# Patient Record
Sex: FEMALE | Race: WHITE | Employment: FULL TIME | URBAN - METROPOLITAN AREA
[De-identification: names, ages, dates, MRNs, and addresses within clinical notes are randomized per-mention and may not be internally consistent; named-entity substitution may affect disease eponyms.]

---

## 2022-06-17 NOTE — PROGRESS NOTES
Diane Blackwell MD                                  [address] [facility] 2050, [address] [facility]                                  [place] [address]                                  Phone [phone], Fax [phone]      History and Physical    Patient: Jeronimo Leyva MRN: [id number]     YOB: 1975  Age: 48 y.o. Sex: female              PCP: Shobha Townsend APRN - NP   Date:  6/20/2022      Jeronimo Leyva  who presents to the [facility] for consideration of bariatric surgery. This is her initial consultation preparing her for our multi-disciplinary surgical preparatory regimen. She presents with a height of 5' 3.5\" (1.613 m) and weight of 293 lb (132.9 kg), giving her a Body mass index is 51.09 kg/m². She has an Ideal body weight of 145 lbs, and excess body weight of 148 lbs. 30-day Bariatric Surgical Risk Percentage: 3.18, 8.15%    MEDICAL HISTORY:  Morbid Obesity  Depression  Anxiety  Hx of blood transfusion  Hx of COVID-19  Hx of kidney stones    Comorbidity Yes or No   Obstructive Sleep Apnea No   Diabetes Mellitus No   Hypertension-  Number of medications= 1 Yes   Gastroesophageal Reflux  Treatment Med= Omeprazole Yes   Hyperlipidemia with medication No   Coronary Artery Disease No   Cancer No   Asthma No   Osteoarthritis No   Joint Pain Yes     *EGD    Other Yes or No   Venous Stasis No   Dialysis No   Long term use of steroids or immunocompromised conditions No   On Anticoagulants No       PRIOR WEIGHT LOSS ATTEMPTS:  Reports 11-15 previous attempts including Phentermine, keto diet and low carb diet. EXERCISE ASSESSMENT:  Reports occasional exercise via walking. DENTAL ISSUES:  No dental issues with chewing. PSYCHOSOCIAL:  She notes she is  and states main support person is her , Lily Bauer. She is employed in [address] [place] [address] at [employer]. Her goal in pursuing surgical weight loss is to improve health.   She reports appropriate treatment of depression and anxiety. Denies history of mental health disorders . She states she is independent in her care, can drive a car, and can ambulate without assistance. HISTORY:  No past medical history on file. She denies personal or family history of problems with anesthesia, bleeding, or clotting. She denies history of DVT or pulmonary embolism. She denies reflux or dysphagia. No past surgical history on file. Social History     Tobacco Use    Smoking status: Not on file    Smokeless tobacco: Not on file   Substance Use Topics    Alcohol use: Not on file    Drug use: Not on file     No family history on file. MEDICATIONS:  No current outpatient medications on file. No current facility-administered medications for this visit. ALLERGIES:  Allergies   Allergen Reactions    Anesthesia S-I-40 [Propofol] Nausea And Vomiting    Lortab [Hydrocodone-Acetaminophen] Itching and Nausea And Vomiting       ROS: The patient has no difficulty with chest pain or shortness of breath. No fever or chills. Comprehensive 13 point review of systems was otherwise unremarkable except as noted above. PHYSICAL EXAM:   BP (!) 160/98   Pulse 96   Ht 5' 3.5\" (1.613 m)   Wt 293 lb (132.9 kg)   BMI 51.09 kg/m²     General: Alert oriented cooperative white female in no acute distress. Eyes: Sclera are clear. Conjunctiva and lids within normal limits. No icterus. Ears and Nose: no gross deformities to visual inspection, gross hearing intact  Neck: Supple, trachea midline, no appreciable thyromegaly  Resp: No JVD. Breathing is  non-labored. Lungs clear to auscultation without wheezing or rhonchi   CV: RRR. No murmurs, rubs or gallops appreciated, Carotid bruits are absent  Abd: soft non-tender and non-distended without peritoneal signs. +bs    Extremities: non-tender. No edema   Neuro:  No focal signs  Psych:  Mood and affect appropriate.   Short-term memory and understanding intact    ASSESSMENT:  Morbid obesity with a  Body mass index is 51.09 kg/m². beginning multi-disciplinary surgical prep program.    PLAN:  We have discussed the patient's participation and reviewed the steps in our preparatory program. She has had a long history of failed diet and exercise programs and has good understanding about the risks, benefits, and commitment related to bariatric surgery. She has attended our comprehensive educational seminar. She is interested in proceeding with our program seeking the sleeve gastrectomy. Nutrition Recommendations:   · Diet Rx - Low-moderate calorie intake. Work on eating at least 3x/d with lean protein focus. 2 week pre-operative diet with caloric restriction of ~1200kcal/d. Exercise Recommendations: Exercise routine, incorporating both cardio and strength training, building up to 5x/wk for at least 30 minutes. Physical therapy evaluation for guidance on exercise routine. She will complete the following steps:  1. Complete bariatric labs after dietitian evaluation. 2.  Participation in our behavior modification program, reduced calorie diet program, and participation in our exercise program recommendations supervised by our office. 3.  Complete our required psychological evaluation. 4.  Reminded of policy of no weight gain during prep period. 5.  Upper GI  6. PT Consult       She will return after the above are complete for assessment of her progress and schedule surgery. Rayvon Berg [place] MD    Date:  6/20/2022

## 2022-06-20 ENCOUNTER — OFFICE VISIT (OUTPATIENT)
Dept: SURGERY | Age: 47
End: 2022-06-20
Payer: COMMERCIAL

## 2022-06-20 VITALS
WEIGHT: 293 LBS | HEART RATE: 96 BPM | DIASTOLIC BLOOD PRESSURE: 98 MMHG | BODY MASS INDEX: 50.02 KG/M2 | HEIGHT: 64 IN | SYSTOLIC BLOOD PRESSURE: 160 MMHG

## 2022-06-20 DIAGNOSIS — E66.01 MORBID OBESITY (HCC): Primary | ICD-10-CM

## 2022-06-20 DIAGNOSIS — Z01.812 ENCOUNTER FOR PRE-OPERATIVE LABORATORY TESTING: ICD-10-CM

## 2022-06-20 DIAGNOSIS — I10 PRIMARY HYPERTENSION: ICD-10-CM

## 2022-06-20 DIAGNOSIS — Z87.19 HX OF GASTROESOPHAGEAL REFLUX (GERD): ICD-10-CM

## 2022-06-20 DIAGNOSIS — E66.01 OBESITY, CLASS III, BMI 40-49.9 (MORBID OBESITY) (HCC): ICD-10-CM

## 2022-06-20 PROCEDURE — APPSS30 APP SPLIT SHARED TIME 16-30 MINUTES: Performed by: PHYSICIAN ASSISTANT

## 2022-06-20 PROCEDURE — 99204 OFFICE O/P NEW MOD 45 MIN: CPT | Performed by: SURGERY

## 2022-06-29 ENCOUNTER — HOSPITAL ENCOUNTER (OUTPATIENT)
Dept: PHYSICAL THERAPY | Age: 47
Setting detail: RECURRING SERIES
Discharge: HOME | End: 2022-07-02
Payer: COMMERCIAL

## 2022-06-29 PROCEDURE — 97162 PT EVAL MOD COMPLEX 30 MIN: CPT

## 2022-06-29 PROCEDURE — 97110 THERAPEUTIC EXERCISES: CPT

## 2022-06-29 ASSESSMENT — PAIN SCALES - GENERAL: PAINLEVEL_OUTOF10: 6

## 2022-06-29 NOTE — PLAN OF CARE
Naa Linder  : 1975  Primary: Yasmin Simon  Secondary:  [address] [address]  Phone: [phone]  Fax: [phone] Plan Frequency: 1 time    No data recorded    PT Visit Info: Total # of Visits to Date: 1      OUTPATIENT PHYSICAL THERAPY:OP NOTE TYPE: Initial Assessment 2022               Episode  Appt Desk         Treatment Diagnosis:  Generalized muscle weakness(M62.81)  Medical/Referring Diagnosis:  Morbid (severe) obesity due to excess calories [E66.01]  Referring Physician:  Brina Rand MD MD Orders:  PT Eval and Treat   Return MD Appt:  unsure  Date of Onset:  No data recorded   Allergies:  Anesthesia s-i-40 [propofol] and Lortab [hydrocodone-acetaminophen]  Restrictions/Precautions:    Restrictions/Precautions: None  Spinal Precautions: lumbar fusion     Medications Last Reviewed:  2022     SUBJECTIVE   History of Injury/Illness (Reason for Referral):  Pt reports not being able to lose weight on her own and has even worked out with a  in the gym so she has opted for surgery. Patient Stated Goal(s):  \"be ready for surgery. Get back to exercising in the gym\"  Initial:     6/10 Post Session:       no number or complaint of pain  Past Medical History/Comorbidities:    Ms. Rosario Collins  has a past surgical history that includes back surgery. 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   No data recordedNo data recorded   Learning:   Does the patient/guardian have any barriers to learning?: No barriers  Will there be a co-learner?: No  What is the preferred language of the patient/guardian?: English  How does the patient/guardian prefer to learn new concepts?: Listening; Reading; Demonstration; Pictures/Videos     Fall Risk Scale: Total Score: 0  Martel Fall Risk: Low (0-24)           OBJECTIVE   Observation: excessive lumbar lordosis from fusion. No distress. Fluent movements  ROM: BUE/LE are within normal limits. Trunk WNL  Strength: BUE/LE 5/5. Core 4-/5  ASSESSMENT   Initial Assessment:  Pt comes to therapy for pre bariatric exercise program. She displayed no issues with ROM or strength but does have a weak core. She is motivated and demonstrated good technique with the exercises. She desires to continue as a home program. Goal met to be independent. Problem List: (Impacting functional limitations): Body Structures, Functions, Activity Limitations Requiring Skilled Therapeutic Intervention: Decreased strength     Therapy Prognosis:   Therapy Prognosis: Excellent        PLAN   Effective Dates: 6/29/22 TO today  Frequency/Duration: Plan Frequency: 1 time     Interventions Planned (Treatment may consist of any combination of the following):    Current Treatment Recommendations: Home exercise program     Goals: (Goals have been discussed and agreed upon with patient.)  Discharge Goals: Time Frame: 1 visit  1. Pt independent with HEP- met         Outcome Measure: Tool Used: Lower Extremity Functional Scale (LEFS)  Score:  Initial: 69/80 Most Recent: X/80 (Date: -- )   Interpretation of Score: 20 questions each scored on a 5 point scale with 0 representing \"extreme difficulty or unable to perform\" and 4 representing \"no difficulty\". The lower the score, the greater the functional disability. 80/80 represents no disability. Minimal detectable change is 9 points. Total Duration:  Time In: 0900  Time Out: 1088    Thank you for this referral,  Sheyla Santillan PT     Referring Physician Signature: Mino Collier MD No Signature is Required for this note.         Post Session Pain  Charge Capture  PT Visit Info  POC Link  Treatment Note Link  MD Guidelines  MyChart

## 2022-06-29 NOTE — PROGRESS NOTES
Elaine German  : 1975  Primary: Mark Olivarez  Secondary:  [address] [address]  Phone: [phone]  Fax: [phone] Plan Frequency: 1 time  22 to today     PT Visit Info: Total # of Visits to Date: 1      OUTPATIENT PHYSICAL THERAPY:OP NOTE TYPE: Treatment Note 2022       Episode  }Appt Desk              Treatment Diagnosis: (62.81) generalized muscle weakness  Medical/Referring Diagnosis:  Morbid (severe) obesity due to excess calories [E66.01]  Referring Physician:  Parisa Jackman MD MD Orders:  PT Eval and Treat   Date of Onset:  No data recorded   Allergies:   Anesthesia s-i-40 [propofol] and Lortab [hydrocodone-acetaminophen]  Restrictions/Precautions:  Restrictions/Precautions: None  Spinal Precautions: lumbar fusion     Interventions Planned (Treatment may consist of any combination of the following):    Current Treatment Recommendations: Home exercise program     Subjective Comments:    pt reports trying to lose weight and even lifting weights in the gym with  didn't help so she has opted for surgery  Initial:}    6/10Post Session:        no  Number or complaint of pain  Medications Last Reviewed:  2022  Updated Objective Findings:  See evaluation note from today  Treatment   THERAPEUTIC EXERCISE: (20 minutes):    Exercises per grid below to improve mobility and strength. Required moderate verbal and manual cues to promote proper body alignment, promote proper body posture, promote proper body mechanics and promote proper body breathing techniques. Reviewed and issued HEP with hooklying ball press with core, bridging with SAQ and core, LTR with core, quadruped UE/LE lift with core, seated hamstring stretch with anterior pelvic tilt, standing quad stretch, diagonal ball lift with squat, \"Y\" for core and upper trap. Pt to perform rior to surgery and after once cleared by MD. Pt agreed.     Treatment/Session Summary:    · Treatment Assessment:   good return demonstration of exercises. Goal met  · Communication/Consultation:  None today  · Equipment provided today:  None  · Recommendations: discharge to [facility].     Total Treatment Billable Duration:  20 minutes in addition to evaluation   Time In: 0900  Time Out: Adilene Haji 178, PT       Charge Capture  }Post Session Pain  PT Visit Info  [address] [facility] Portal  MD Guidelines  Scanned Media  Benefits  MyChart    Future Appointments   Date Time Provider Polly Trejoi   7/8/2022  9:30 AM SFD XR RF ROOM 2 SFDRAD SFD   7/29/2022  8:00 AM NORTH Morgan GVL AMB   8/4/2022  8:00 AM ORLY Chin GVL AMB   8/19/2022  8:00 AM ORLY Chin GVL AMB

## 2022-07-08 ENCOUNTER — HOSPITAL ENCOUNTER (OUTPATIENT)
Dept: GENERAL RADIOLOGY | Age: 47
Discharge: HOME | End: 2022-07-11
Payer: COMMERCIAL

## 2022-07-08 DIAGNOSIS — E66.01 MORBID OBESITY (HCC): ICD-10-CM

## 2022-07-08 PROCEDURE — 6360000004 HC RX CONTRAST MEDICATION: Performed by: SURGERY

## 2022-07-08 PROCEDURE — 74220 X-RAY XM ESOPHAGUS 1CNTRST: CPT

## 2022-07-08 PROCEDURE — 2500000003 HC RX 250 WO HCPCS: Performed by: SURGERY

## 2022-07-08 PROCEDURE — 6370000000 HC RX 637 (ALT 250 FOR IP): Performed by: SURGERY

## 2022-07-08 PROCEDURE — A4641 RADIOPHARM DX AGENT NOC: HCPCS | Performed by: SURGERY

## 2022-07-08 RX ADMIN — BARIUM SULFATE 140 ML: 980 POWDER, FOR SUSPENSION ORAL at 10:28

## 2022-07-08 RX ADMIN — BARIUM SULFATE 355 ML: 0.6 SUSPENSION ORAL at 10:28

## 2022-07-08 RX ADMIN — BARIUM SULFATE 1 TABLET: 700 TABLET ORAL at 10:28

## 2022-07-08 RX ADMIN — ANTACID/ANTIFLATULENT 1 EACH: 380; 550; 10; 10 GRANULE, EFFERVESCENT ORAL at 10:28

## 2022-07-24 NOTE — PROGRESS NOTES
Elysia Ochoa PA-C  Bariatric & Advanced Laparoscopic Surgery & Endoscopy  [address] [place] [address] [address]50, [address] [facility]  Eufemia Quintanalidia Gloria  Phone [phone]   Fax [phone]    Date of visit: 2022      Primary/Requesting provider: DEBBIE Barger NP         Name: Ed Deleon      MRN: [id number]       : 1975       Age: 48 y.o. Sex: female        PCP: DEBBIE Barger NP     CC:  Bariatric monthly dietary follow up    Surgeon: Dr. Dg Nair. Matheus    Procedure: laparoscopic sleeve gastrectomy    Surgical Consult Date: 2022    The patient is a 48 y.o. female presenting with a height of 5' 3.5\" (1.613 m) and weight of 293 lb (132.9 kg), giving her a Body mass index is 51.09 kg/m². She has an ideal body weight of 143 lbs, and excess body weight of 150 lbs. She started our program with a weight of 293 lbs, remained weight stable. She is in the process of completing all aspects of our prep program and to be deemed an acceptable candidate for bariatric surgery. Patient has a long term history of obesity with multiple failed attempts at weight reduction. Patient denies any changes in health history or physical health since last visit. Patient has been adherent to her diet and exercise regimen. Patient feels that she is well informed regarding her bariatric surgery choice and would like to proceed with a laparoscopic sleeve gastrectomy for weight reduction, improvement of her medical conditions, and improved quality of life. Patient verbalized understanding of the risks associated with bariatric surgery. Patient also verbalized understanding that bariatric surgery is a tool and that weight reduction is dependent on behavioral changes in regards to what she eats and how much.     NUTRITION ASSESSMENT:   Diet Recall:   Breakfast: protein shake  Lunch: grilled chicken sandwich  Dinner: grilled chicken with mushrooms, pasta  Beverages: crystal light lemonade, water  Habits:   Eating 3x/day: yes  Elimination of caffeine and carbonated beverages: yes  Practicing not drinking with meals: yes     EXERCISE ASSESSMENT: Pt is currently exercising via walking 3-4x per week for 30 minutes. NIH Guidelines reviewed with pt. PSYCHOLOGICAL EVALUATION:  Scheduled, 08/04/2022 with Venessa Grewal  DIETITIAN EVALUATION:  In progress with Marie Hinton RD, LD  BARIATRIC LABS:  Completed, 07/28/2022 (vitamin D 14.7, ferritin 6)  UPPER GI:  Completed, 07/08/2022  1. Small sliding hiatal hernia. 2. Duodenal diverticulum. PHYSICAL THERAPY EVALUATION FOR MOBILITY OPTIMIZATION:  Completed, 06/29/2022    MEDICAL HISTORY:  Morbid Obesity  Depression  Anxiety  Hx of blood transfusion  Hx of COVID-19  Hx of kidney stones     Comorbidity Yes or No   Obstructive Sleep Apnea No   Diabetes Mellitus No   Hypertension-  Number of medications= 1 Yes   Gastroesophageal Reflux  Treatment Med= Omeprazole Yes   Hyperlipidemia with medication No   Coronary Artery Disease No   Cancer No   Asthma No   Osteoarthritis No   Joint Pain Yes        Other Yes or No   Venous Stasis No   Dialysis No   Long term use of steroids or immunocompromised conditions No   On Anticoagulants No      PMH:  No past medical history on file. PSH:  Past Surgical History:   Procedure Laterality Date    BACK SURGERY         MEDS:  Current Outpatient Medications   Medication Sig Dispense Refill    vitamin D (ERGOCALCIFEROL) 1.25 MG (16834 UT) CAPS capsule Take 1 capsule by mouth once a week 12 capsule 1     No current facility-administered medications for this visit. ALLERGIES:    Allergies   Allergen Reactions    Anesthesia S-I-40 [Propofol] Nausea And Vomiting    Lortab [Hydrocodone-Acetaminophen] Itching and Nausea And Vomiting       SH:       FH:  No family history on file.        Physical Exam:     BP (!) 144/94   Pulse 78   Ht 5' 3.5\" (1.613 m)   Wt 293 lb (132.9 kg)   BMI 51.09 kg/m²     General:  Well-developed, well-nourished, no distress. Psych:  Cooperative, good insight and judgement. Neuro:  Alert, oriented to person, place and time. Lungs:  Unlabored breathing. Symmetrical chest expansion. Heart:  Regular rate and rhythm. Abdomen:  Soft, obese, non-tender, non-distended. No guarding or rebound. No palpable masses. Labs: All recent labs were reviewed. Lab Results   Component Value Date    LABA1C 5.4 07/28/2022     No results found for: TSHFT4, TSH   Lab Results   Component Value Date    VITD25 14.7 (L) 07/28/2022      Lab Results   Component Value Date    CMVJBPCH52 436 07/28/2022      Lab Results   Component Value Date    IRON 51 07/28/2022    FERRITIN 6 (L) 07/28/2022        Imaging: All images were independently reviewed by me. Diagnosis Orders   1. Morbid obesity ([facility]        2. Obesity, morbid, BMI 50 or higher ([facility]        3. Hx of gastroesophageal reflux (GERD)        4. Primary hypertension        5. Dietary counseling        6. Exercise counseling        7. Vitamin D deficiency  vitamin D (ERGOCALCIFEROL) 1.25 MG (53621 UT) CAPS capsule          Assessment/Plan:    Naa Linder is a 48 y.o. female is here for monthly follow-up visit prior to bariatric surgery with medical co-morbidities related to morbid obesity. Patient is an excellent candidate for bariatric surgery and meets NIH criteria for weight loss surgery. In detail, discussed risks and benefits of laparoscopic sleeve gastrectomy. Reviewed information and expectations regarding the pre-operative period, the bariatric procedure, and postoperative period. Stressed with patient importance of understanding bariatric surgery is a tool and will not work if patient does not continue with healthy lifestyle changes including regular exercise and high protein, low calorie, low carb diet. Patient was seen by the dietitian today for continued evaluation and management regarding lifestyle and dietary changes.  Reviewed assessment and plan in detail. Patient verbalized understanding. Questions answered. Recommendations: Continue to work on dietary changes over the next couple of weeks. Complete remaining testing requirements including psychology appointments. She may contact the office after completion to schedule her pre-operative appointment. She does note that she is a difficult IV stick, and previous procedures have required central line placement for IV access. We will likely need to place a central line for her surgery. Won Perdomo PA-C  Bariatric Surgery  7/29/2022    Counseling time:counseling time more than 50% of visit: 45 minutes: I spent this time preparing to see patient (including chart review and preparation), obtaining and/or reviewing additional medical history, performing a physical exam and evaluation, documenting clinical information in the electronic health record, independently interpreting results, communicating results to patient, family or caregiver, and/or coordinating care.

## 2022-07-28 ENCOUNTER — HOSPITAL ENCOUNTER (OUTPATIENT)
Dept: LAB | Age: 47
Discharge: HOME | End: 2022-07-31

## 2022-07-28 DIAGNOSIS — E66.01 MORBID OBESITY (HCC): ICD-10-CM

## 2022-07-28 LAB
EST. AVERAGE GLUCOSE BLD GHB EST-MCNC: 108 MG/DL
HBA1C MFR BLD: 5.4 % (ref 4.8–5.6)

## 2022-07-29 ENCOUNTER — OFFICE VISIT (OUTPATIENT)
Dept: SURGERY | Age: 47
End: 2022-07-29
Payer: COMMERCIAL

## 2022-07-29 VITALS
WEIGHT: 293 LBS | HEIGHT: 64 IN | SYSTOLIC BLOOD PRESSURE: 144 MMHG | BODY MASS INDEX: 50.02 KG/M2 | HEART RATE: 78 BPM | DIASTOLIC BLOOD PRESSURE: 94 MMHG

## 2022-07-29 DIAGNOSIS — E66.01 OBESITY, MORBID, BMI 50 OR HIGHER (HCC): ICD-10-CM

## 2022-07-29 DIAGNOSIS — E55.9 VITAMIN D DEFICIENCY: ICD-10-CM

## 2022-07-29 DIAGNOSIS — Z87.19 HX OF GASTROESOPHAGEAL REFLUX (GERD): ICD-10-CM

## 2022-07-29 DIAGNOSIS — E66.01 MORBID OBESITY (HCC): Primary | ICD-10-CM

## 2022-07-29 DIAGNOSIS — Z71.3 DIETARY COUNSELING: ICD-10-CM

## 2022-07-29 DIAGNOSIS — I10 PRIMARY HYPERTENSION: ICD-10-CM

## 2022-07-29 DIAGNOSIS — Z71.82 EXERCISE COUNSELING: ICD-10-CM

## 2022-07-29 LAB
25(OH)D3 SERPL-MCNC: 14.7 NG/ML (ref 30–100)
FERRITIN SERPL-MCNC: 6 NG/ML (ref 8–388)
FOLATE SERPL-MCNC: 7.7 NG/ML (ref 3.1–17.5)
IRON SERPL-MCNC: 51 UG/DL (ref 35–150)
TSH, 3RD GENERATION: 0.66 UIU/ML (ref 0.36–3.74)
VIT B12 SERPL-MCNC: 436 PG/ML (ref 193–986)

## 2022-07-29 PROCEDURE — 99215 OFFICE O/P EST HI 40 MIN: CPT | Performed by: PHYSICIAN ASSISTANT

## 2022-07-29 RX ORDER — FUROSEMIDE 20 MG/1
20 TABLET ORAL DAILY
Status: ON HOLD | COMMUNITY
Start: 2022-06-06 | End: 2022-09-15 | Stop reason: HOSPADM

## 2022-07-29 RX ORDER — CLONAZEPAM 0.5 MG/1
TABLET ORAL
COMMUNITY
Start: 2022-07-12

## 2022-07-29 RX ORDER — IRBESARTAN 150 MG/1
TABLET ORAL
COMMUNITY
Start: 2022-07-12

## 2022-07-29 RX ORDER — ERGOCALCIFEROL 1.25 MG/1
50000 CAPSULE ORAL WEEKLY
Qty: 12 CAPSULE | Refills: 1 | Status: SHIPPED | OUTPATIENT
Start: 2022-07-29

## 2022-07-29 RX ORDER — VENLAFAXINE HYDROCHLORIDE 75 MG/1
75 CAPSULE, EXTENDED RELEASE ORAL DAILY
COMMUNITY
Start: 2022-07-21

## 2022-07-29 NOTE — PROGRESS NOTES
Ronnie Damon, MS, RD, LD  Surgical Weight Loss Dietitian  [address] [facility], [address] [facility]  Kristin Quintana  Phone [phone]   Fax [phone]    MWL INITIAL ASSESSMENT    Nutrition Assessment:  Anthropometrics:    Ht: 53.5, wt: 293#, 202% IBW, 51.09 BMI  Pt has lost 0 lbs since last visit. Co-morbidities: HTN, GERD, Joint pain    Labs:   Vit D, 25-Hydroxy (ng/mL)   Date Value   07/28/2022 14.7 (L)     Folate (ng/mL)   Date Value   07/28/2022 7.7     Iron (ug/dL)   Date Value   07/28/2022 51     Ferritin (NG/ML)   Date Value   07/28/2022 6 (L)      Pt is not taking a Vitamin D supplement per pt. Referred to provider for recommendation. Macronutrient needs assessment   EER: 3898-6025 kcal/d (14-16 kcal/kg ABW)    MSJ x 1.3-500 = 2021 kcal/d (sedentary AF)   EPR: 55-82g pro (1.0-1.5 gm pro/kg IBW)   CHO: ~253 g CHO/d (50% EER, ~5-6 servings CHO/meal)   H2O: 1mL/kcal or per MD recommendations     Support:  Pt has told , friends, kids, father - to be good support. Reminded pt about SWL support group and online support group. Motivation:  High. Onset of obesity:  Childhood    Potential triggers to weight gain: emotional eating, food-centered gatherings   Hx of obesity in family members include cousins, father, neice. History of Weight loss attempts, goal >10# weight loss:    Prior bariatric programs: none    Commercial programs: none    Registered Dietitian visits: none    Medical Weight Loss programs, including medications: yes, phentermine    Self-supervised diets and exercise: low-carb, low-calorie, low-fat, moderate exercise   Pt has attempted weight loss via modalities listed above - ~20+ attempts made, all without any permanent weight loss. Pt goal in pursuing SWL is better health, better mobility, decreased risk of comorbidities, lose a large amount of weight.       Eating Habits:   Eating occasions/d: 3  Main cook at home: pt and   Restaurant/Fast Food Intake: 1x/week  Food Allergies: none  Cultural Preferences: none  Typical Beverage Consumption: crystal light, water, protein shake  Diet Recall:   Breakfast: protein shake  Lunch: grilled chicken sandwich  Dinner: grilled chicken with mushrooms, pasta  Beverages: crystal light lemonade, water  Feeling after eating meals:  Comfortable  Habits:   Eating mindfully: yes   Drinking after meals: practicing, currently waiting 15-30 minutes   Elimination of sugary/carbonated/caffeinated/alcoholic beverages: yes   Drinking without straws: yes    Lifestyle Assessment:    Hours of sleep/night: ~8-9   Current Occupation: [employer]   Activity during day: moderate   Type of Housing: house    Number of people living in house and influence: 3 including Pt, , and son (13) - to be good influence. Exercise Assessment:   PAR-Q: none   Exercise equipment at home: weights   Gym Membership: none  Medical:     Pain or injuries (present): lower and middle back - lumbar fusion and muscle spasms, bilateral knee pain    Past surgeries related to mobility: L ankle  Current Exercise Routine: walking and pt exercises 3-4x/week for 20 minutes  Assessment Exercise Goal: walking and pt exercises 3-4x/week for 30 minutes    Nutrition Diagnosis:  Morbid obesity R/T excessive energy intake and yoyo dieting as evidenced by BMI = 51.09 and 202 % IBW. Nutrition Intervention:   Diet Rx - Low-moderate calorie intake (~2000 kcal/day). Work on eating 3 meals/d and meal balance. Modify distribution, type or amount of food and nutrients within meals or at a specified time-    Discussed need for 3 meals per day and snacks based on body size. Explained macronutrients and emphasized mindful eating behaviors. Discussed meal priority and encourage practice. Discussed waiting to drink 30 minutes after meals and encouraged practice. Pt goal to increasing hydrating fluid intake.    Educated on counting protein and encouraged practice. Educated on need for identifying MVI for use after sx. *Pt verbalizes understanding of information provided during this session. Nutrition Monitoring and Evaluation:   Monitor for understanding of diet and exercise rx. Follow up for practicing mindful eating behaviors and meal priority. F/U per MD    Impression:      Readiness to learn and change: Moderate   Comprehension level: Moderate   Anticipated compliance: Moderate     Good SWL candidate at this time. Pt has made good changes during supervised diet with RD. RD feels as though pt will be able to adhere to post-operative instructions and plan of care. Pt understands the habits that must be in place to support the SWL tool. I look forward to continuing to work with pt.       Ronnie Damon, MS, RD, LD

## 2022-08-04 ENCOUNTER — OFFICE VISIT (OUTPATIENT)
Dept: BEHAVIORAL/MENTAL HEALTH CLINIC | Facility: CLINIC | Age: 47
End: 2022-08-04

## 2022-08-04 DIAGNOSIS — E66.01 OBESITY, MORBID, BMI 40.0-49.9 (HCC): ICD-10-CM

## 2022-08-04 DIAGNOSIS — F54 PSYCHOLOGICAL AND BEHAVIORAL FACTORS ASSOCIATED WITH DISORDERS OR DISEASES CLASSIFIED ELSEWHERE: Primary | ICD-10-CM

## 2022-08-04 LAB
COTININE SERPL-MCNC: <1 NG/ML
NICOTINE SERPL-MCNC: <1 NG/ML

## 2022-08-04 ASSESSMENT — ANXIETY QUESTIONNAIRES
7. FEELING AFRAID AS IF SOMETHING AWFUL MIGHT HAPPEN: 0
4. TROUBLE RELAXING: 0
5. BEING SO RESTLESS THAT IT IS HARD TO SIT STILL: 0
6. BECOMING EASILY ANNOYED OR IRRITABLE: 0
GAD7 TOTAL SCORE: 0
1. FEELING NERVOUS, ANXIOUS, OR ON EDGE: 0
2. NOT BEING ABLE TO STOP OR CONTROL WORRYING: 0
3. WORRYING TOO MUCH ABOUT DIFFERENT THINGS: 0

## 2022-08-04 ASSESSMENT — PATIENT HEALTH QUESTIONNAIRE - PHQ9
5. POOR APPETITE OR OVEREATING: 0
7. TROUBLE CONCENTRATING ON THINGS, SUCH AS READING THE NEWSPAPER OR WATCHING TELEVISION: 0
SUM OF ALL RESPONSES TO PHQ QUESTIONS 1-9: 1
3. TROUBLE FALLING OR STAYING ASLEEP: 1
6. FEELING BAD ABOUT YOURSELF - OR THAT YOU ARE A FAILURE OR HAVE LET YOURSELF OR YOUR FAMILY DOWN: 0
8. MOVING OR SPEAKING SO SLOWLY THAT OTHER PEOPLE COULD HAVE NOTICED. OR THE OPPOSITE, BEING SO FIGETY OR RESTLESS THAT YOU HAVE BEEN MOVING AROUND A LOT MORE THAN USUAL: 0
1. LITTLE INTEREST OR PLEASURE IN DOING THINGS: 0
SUM OF ALL RESPONSES TO PHQ QUESTIONS 1-9: 1
4. FEELING TIRED OR HAVING LITTLE ENERGY: 0
SUM OF ALL RESPONSES TO PHQ QUESTIONS 1-9: 1
2. FEELING DOWN, DEPRESSED OR HOPELESS: 0
SUM OF ALL RESPONSES TO PHQ9 QUESTIONS 1 & 2: 0
SUM OF ALL RESPONSES TO PHQ QUESTIONS 1-9: 1
10. IF YOU CHECKED OFF ANY PROBLEMS, HOW DIFFICULT HAVE THESE PROBLEMS MADE IT FOR YOU TO DO YOUR WORK, TAKE CARE OF THINGS AT HOME, OR GET ALONG WITH OTHER PEOPLE: 0
9. THOUGHTS THAT YOU WOULD BE BETTER OFF DEAD, OR OF HURTING YOURSELF: 0

## 2022-08-04 ASSESSMENT — LIFESTYLE VARIABLES
HOW OFTEN DO YOU HAVE A DRINK CONTAINING ALCOHOL: MONTHLY OR LESS
HOW MANY STANDARD DRINKS CONTAINING ALCOHOL DO YOU HAVE ON A TYPICAL DAY: 1 OR 2
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
## 2022-08-19 ENCOUNTER — OFFICE VISIT (OUTPATIENT)
Dept: BEHAVIORAL/MENTAL HEALTH CLINIC | Facility: CLINIC | Age: 47
End: 2022-08-19
Payer: COMMERCIAL

## 2022-08-19 DIAGNOSIS — E66.01 OBESITY, MORBID, BMI 40.0-49.9 (HCC): ICD-10-CM

## 2022-08-19 DIAGNOSIS — F54 PSYCHOLOGICAL AND BEHAVIORAL FACTORS ASSOCIATED WITH DISORDERS OR DISEASES CLASSIFIED ELSEWHERE: Primary | ICD-10-CM

## 2022-08-19 PROCEDURE — 90834 PSYTX W PT 45 MINUTES: CPT | Performed by: SOCIAL WORKER

## 2022-08-19 NOTE — PROGRESS NOTES
Haydee Torres MD                                  [address]                                  [place] [address]                                  Phone [phone], Fax [phone]    History and Physical    Patient: Luis Sherfif MRN: [id number]  SSN: [id number]    YOB: 1975  Age: 48 y.o. Sex: female              PCP: DEBBIE Dumas NP   Date:  8/25/2022    Consult Date: 06/20/2022    Bariatric Procedure of Interest: laparoscopic sleeve gastrectomy    Luis Sheriff returns to the [facility] after successful completion of our multidisciplinary surgical prep program.  This is her final consultation preparing her for sleeve gastrectomy surgery. She presents with a height of 5' 3.5\" (1.613 m) and weight of 292 lb (132.5 kg), giving her a Body mass index is 50.91 kg/m². She has an ideal body weight of 143 lbs, and excess body weight of 149 lbs. She started our program with a weight of 293 lbs, losing 1 lbs. 30-day Bariatric Surgical Risk Percentage: 3.18%    She has completed all aspects of our prep program and has been deemed an acceptable candidate for bariatric surgery. PSYCHOLOGICAL EVALUATION:   Completed, 08/04/2022 with Georgia Li deeming her an appropriate surgical candidate. DIETITIAN EVALUATION:  Completed with Phoebe West RD, MARKUS deeming her an appropriate surgical candidate. BARIATRIC LABS:  Completed, 07/28/2022 (vitamin D 14.7, ferritin 6)    UPPER GI:  Completed, 07/08/2022  1. Small sliding hiatal hernia. 2. Duodenal diverticulum. PHYSICAL THERAPY EVALUATION FOR MOBILITY OPTIMIZATION:  Completed, 06/29/2022    We have reviewed the procedure again today and completed our standard pre op teaching. Her questions were answered and she is ready to schedule surgery. We have discussed the procedure, diet and exercise regimens, and potential complications of surgery.   The patient understands the nature and potential complication of surgery and has the capacity to follow the post operative diet, exercise, and nutritional requirements. After review, she has signed her surgical consent today. MEDICAL HISTORY:  Morbid Obesity  Depression  Anxiety  Hx of blood transfusion  Hx of COVID-19  Hx of kidney stones     Comorbidity Yes or No   Obstructive Sleep Apnea No   Diabetes Mellitus No   Hypertension-  Number of medications= 1 Yes   Gastroesophageal Reflux  Treatment Med= Omeprazole Yes   Hyperlipidemia with medication No   Coronary Artery Disease No   Cancer No   Asthma No   Osteoarthritis No   Joint Pain Yes       Other Yes or No   Venous Stasis No   Dialysis No   Long term use of steroids or immunocompromised conditions No   On Anticoagulants No         PRIOR WEIGHT LOSS ATTEMPTS:  Reports 11-15 previous attempts including Phentermine, keto diet and low carb diet. EXERCISE ASSESSMENT:  Reports occasional exercise via walking. DENTAL ISSUES:  No dental issues with chewing. PSYCHOSOCIAL:  She notes she is  and states main support person is her , Bruce Whelan. She is employed in [address] [place] [address] at [employer]. Her goal in pursuing surgical weight loss is to improve health. She reports appropriate treatment of depression and anxiety. Denies history of mental health disorders. She states she is independent in her care, can drive a car, and can ambulate without assistance. HISTORY:  History reviewed. No pertinent past medical history. She denies personal or family history of problems with anesthesia, bleeding, or clotting. She denies history of DVT or pulmonary embolism. She denies dysphagia. Past Surgical History:   Procedure Laterality Date    BACK SURGERY       Social History     Tobacco Use    Smoking status: Never    Smokeless tobacco: Never   Substance Use Topics    Alcohol use: Never    Drug use: Never     History reviewed. No pertinent family history. MEDICATIONS:  Current Outpatient Medications   Medication Sig Dispense Refill    omeprazole (PRILOSEC) 40 MG delayed release capsule Take 1 capsule by mouth every morning (before breakfast) 90 capsule 0    oxyCODONE-acetaminophen (PERCOCET) 5-325 MG per tablet Take 1 tablet by mouth every 6 hours as needed for Pain for up to 5 days. Intended supply: 5 days. Take lowest dose possible to manage pain 20 tablet 0    ondansetron (ZOFRAN) 4 MG tablet Take 1 tablet by mouth 3 times daily as needed for Nausea or Vomiting 30 tablet 0    vitamin D (ERGOCALCIFEROL) 1.25 MG (45458 UT) CAPS capsule Take 1 capsule by mouth once a week 12 capsule 1    clonazePAM (KLONOPIN) 0.5 MG tablet TAKE 1/2 TO 1 TABLET BY MOUTH EVERY 8 HOURS AS NEEDED FOR ANXIETY      furosemide (LASIX) 20 MG tablet TAKE 1 TABLET BY MOUTH EVERY MORNING AS NEEDED FOR FLUID RETENTION      irbesartan (AVAPRO) 150 MG tablet TAKE 1 TABLET BY MOUTH EVERY DAY FOR BLOOD PRESSURE. STOP LOSARTAN      venlafaxine (EFFEXOR XR) 75 MG extended release capsule        No current facility-administered medications for this visit. ALLERGIES:  Allergies   Allergen Reactions    Anesthesia S-I-40 [Propofol] Nausea And Vomiting    Hydrocodone-Acetaminophen Itching and Nausea And Vomiting       ROS: The patient has no difficulty with chest pain or shortness of breath. No fever or chills. Comprehensive 13 point review of systems was otherwise unremarkable except as noted above. PHYSICAL EXAM:   BP (!) 158/99   Pulse 79   Ht 5' 3.5\" (1.613 m)   Wt 292 lb (132.5 kg)   BMI 50.91 kg/m²     General: Alert oriented cooperative white female in no acute distress. Eyes: Sclera are clear. Conjunctiva and lids within normal limits. No icterus. Ears and Nose: no gross deformities to visual inspection, gross hearing intact  Neck: Supple, trachea midline, no appreciable thyromegaly  Resp: No JVD. Breathing is  non-labored.  Lungs clear to auscultation without wheezing or rhonchi   CV: RRR. No murmurs, rubs or gallops appreciated, Carotid bruits are absent  Abd: soft non-tender and non-distended without peritoneal signs. +bs    Psych:  Mood and affect appropriate. Short-term memory and understanding intact      ASSESSMENT:  Morbid obesity with a  Body mass index is 50.91 kg/m². ready for sleeve gastrectomy surgery. PLAN:  1.  Schedule for laparoscopic, possible open, sleeve gastrectomy, in the near future. The gastric sleeve procedure is performed utilizing a stapler to remove a portion of the stomach, creating a smaller, banana-shaped tube. The gastric bypass procedure is performed by utilizing a stapler to create a smaller stomach pouch with the upper part of the stomach. The upper part of the small intestine is then divided into a tube with two ends. One end is connected to the new stomach pouch and the other end is connected to another part of the small intestine. This creates a new pathway for food, bypassing a portion of the small intestine. Intraoperative EGD may be performed during the procedure and is considered medically necessary to rule out concerns with leak, bleeding, and/or obstruction. 2.  Patient will complete our 2 week pre operative diet. 3.  Bring incentive spirometer (given with our standard instruction to use BID 2 weeks prior to surgery) to hospital on day of surgery. 4.  The patient received prescription to use after surgery for: Percocet, Zofran, and Prilosec. I went over the risks and benefits with the patient. For risks I went over problems with bleeding, infection and anesthesia. I also noted potential problems of injury to the esophagus, liver, spleen, stomach, pancreas, small bowel and or colon. I addition, I discussed potential problems of DVT/pulmonary embolism, urinary tract infection, wound infection, myocardial infarction, stroke and the potential need to convert to an open procedure.  I quoted a 1% nationwide mortality rate for this procedure. She has signed our extensive consent form which is in the chart.        Ezio Jimenez MD  Date:  8/25/2022

## 2022-08-19 NOTE — PROGRESS NOTES
BARIATRIC FOLLOW UP NOTE  NAME: Eva Melgar    DATE: 8/19/2022    TYPE OF SERVICE: Individual Treatment    LOCATION OF SERVICE: [facility]/ In person visit at office    DURATION: 45 mins    DIAGNOSIS: :    1. Psychological and behavioral factors associated with disorders or diseases classified elsewhere    2. Obesity, morbid, BMI 40.0-49.9 ([facility] [address]        SURGERY STATUS: Approved    MENTAL STATUS EXAM:  Pt was appropriately groomed. Pt was 0x4. No unusual mannerisms were noted. No psychotic symptoms displayed by pt. Pt was cooperative and engaged in the session. Insight and judgment were intact. Denied suicidal or homicidal ideation. Perceptions were appropriate. Attention and concentration were normal.  No memory impairments were noted. Fund of knowledge was within normal limits. Speech pattern and language were intact. 0x4. Denied SI/HI. Mood/Affect: mildly upset but then calmed  Thought Process: linear and coherent    Pt is progressing on goals. Pt is a 48 y.o. female who presents today for the second mandatory appointment with this SW following the initial psychological evaluation for bariatric surgery. The pt was approved for surgery. Assessment    Eating Behaviors: The pt reports eating behaviors have been going well. The pt has been eating protein and vegetables and  has been limiting carbohydrates. The pt has not been drinking with her meals. The pt  has been eating three meals a day. Any recent weight gain is denied. Straws are not being used. Pt is attempting to practice mindful eating. Exercise: The pt has been walking regularly. Carbonation: None    Caffeine: None    Logging Food:  The pt has not been logging her food. This SW recommended using the [employer] mariely in the future to to ensure getting the needed protein. Support System:  Intact    Medications: The pt is compliant with her medications.     Alcohol/Drug/Nicotine Use: None    Post Surgery Behavioral Plan Completed: Yes    Clinical Narrative:Pt came in today for the 2-4 week mandatory bariatric follow up appointment. The behavioral plan was completed. Meal planning, exercise, dealing with holidays and vacations, coping skills, boundary setting and possible relationship issues were addressed. Information on urge surfing was given. Attuned movement and eating were also addressed. The ACT concept of acceptance was also discussed. The pt was initially upset upon arrival.  Her best friend called her this am and the pt learned her friend's 29year old son had  suddenly. Support and active listening were provided. Plan: Pt encouraged to attend the supportive group. Continued adherence to the bariatric regimen was reinforced. Outpatient therapy appointments were offered if needed in the future.     Follow Up: PRN

## 2022-08-25 ENCOUNTER — PREP FOR PROCEDURE (OUTPATIENT)
Dept: SURGERY | Age: 47
End: 2022-08-25

## 2022-08-25 ENCOUNTER — OFFICE VISIT (OUTPATIENT)
Dept: SURGERY | Age: 47
End: 2022-08-25
Payer: COMMERCIAL

## 2022-08-25 VITALS
HEIGHT: 64 IN | SYSTOLIC BLOOD PRESSURE: 158 MMHG | BODY MASS INDEX: 49.85 KG/M2 | DIASTOLIC BLOOD PRESSURE: 99 MMHG | HEART RATE: 79 BPM | WEIGHT: 292 LBS

## 2022-08-25 DIAGNOSIS — R11.2 POST-OPERATIVE NAUSEA AND VOMITING: ICD-10-CM

## 2022-08-25 DIAGNOSIS — I10 PRIMARY HYPERTENSION: ICD-10-CM

## 2022-08-25 DIAGNOSIS — Z87.19 HX OF GASTROESOPHAGEAL REFLUX (GERD): ICD-10-CM

## 2022-08-25 DIAGNOSIS — Z98.890 POST-OPERATIVE NAUSEA AND VOMITING: ICD-10-CM

## 2022-08-25 DIAGNOSIS — E66.01 MORBID OBESITY (HCC): Primary | ICD-10-CM

## 2022-08-25 DIAGNOSIS — E55.9 VITAMIN D DEFICIENCY: ICD-10-CM

## 2022-08-25 DIAGNOSIS — E66.01 OBESITY, MORBID, BMI 50 OR HIGHER (HCC): ICD-10-CM

## 2022-08-25 DIAGNOSIS — G89.18 POST-OPERATIVE PAIN: ICD-10-CM

## 2022-08-25 PROCEDURE — APPSS45 APP SPLIT SHARED TIME 31-45 MINUTES: Performed by: PHYSICIAN ASSISTANT

## 2022-08-25 PROCEDURE — 99213 OFFICE O/P EST LOW 20 MIN: CPT | Performed by: SURGERY

## 2022-08-25 RX ORDER — OXYCODONE HYDROCHLORIDE AND ACETAMINOPHEN 5; 325 MG/1; MG/1
1 TABLET ORAL EVERY 6 HOURS PRN
Qty: 20 TABLET | Refills: 0 | Status: SHIPPED | OUTPATIENT
Start: 2022-08-25 | End: 2022-08-30

## 2022-08-25 RX ORDER — OMEPRAZOLE 40 MG/1
40 CAPSULE, DELAYED RELEASE ORAL
Qty: 90 CAPSULE | Refills: 0 | Status: SHIPPED | OUTPATIENT
Start: 2022-08-25

## 2022-08-25 RX ORDER — ONDANSETRON 4 MG/1
4 TABLET, FILM COATED ORAL 3 TIMES DAILY PRN
Qty: 30 TABLET | Refills: 0 | Status: SHIPPED | OUTPATIENT
Start: 2022-08-25

## 2022-08-31 ENCOUNTER — HOSPITAL ENCOUNTER (OUTPATIENT)
Dept: SURGERY | Age: 47
Discharge: HOME | End: 2022-09-03
Payer: COMMERCIAL

## 2022-08-31 VITALS
WEIGHT: 292.6 LBS | TEMPERATURE: 98 F | HEART RATE: 74 BPM | BODY MASS INDEX: 49.95 KG/M2 | RESPIRATION RATE: 16 BRPM | HEIGHT: 64 IN | DIASTOLIC BLOOD PRESSURE: 95 MMHG | SYSTOLIC BLOOD PRESSURE: 149 MMHG | OXYGEN SATURATION: 92 %

## 2022-08-31 LAB
CREAT SERPL-MCNC: 0.55 MG/DL (ref 0.6–1)
HGB BLD-MCNC: 13.2 G/DL (ref 11.7–15.4)
POTASSIUM SERPL-SCNC: 3.8 MMOL/L (ref 3.5–5.1)

## 2022-08-31 PROCEDURE — 84132 ASSAY OF SERUM POTASSIUM: CPT

## 2022-08-31 PROCEDURE — 85018 HEMOGLOBIN: CPT

## 2022-08-31 PROCEDURE — 82565 ASSAY OF CREATININE: CPT

## 2022-08-31 RX ORDER — VENLAFAXINE HYDROCHLORIDE 37.5 MG/1
37.5 CAPSULE, EXTENDED RELEASE ORAL DAILY
COMMUNITY

## 2022-08-31 NOTE — PERIOP NOTE
Lab results within anesthesia guidelines.
Patient verified name and     Order for consent not found in EHR. Type 2 surgery, PAT walk in assessment complete. Labs per surgeon: no orders received. Labs per anesthesia protocol: Hgb, Crt and K+; results pending. EKG: none needed per anesthesia protocol. Hospital approved surgical skin cleanser and instructions given per hospital policy. Patient provided with and instructed on educational handouts including Guide to Surgery, Pain Management, Hand Hygiene, Blood Transfusion Education, and [place] [facility] Brochure. Patient answered medical/surgical history questions at their best of ability. All prior to admission medications documented in [facility]. Original medication prescription bottle not visualized during patient appointment. Patient instructed to hold all vitamins 7 days prior to surgery and NSAIDS 5 days prior to surgery, patient verbalized understanding. Patient teach back successful and patient demonstrates knowledge of instructions.
SURGICAL WEIGHT LOSS Enhanced Recovery After Surgery: diabetic and non-diabetic patients      The night before surgery 9/13/22, drink 1 bottles of the Ensure Pre-Surgery drink. The morning of surgery 9/14/22, drink 1/2 bottle of the Ensure Pre-Surgery drink while on your way to the hospital. Drink this over 5-10 minutes. Drink nothing else after drinking the pre-surgical drink the morning of surgery. Bring your patient handbook with you to the hospital.        Things to Remember:      1. You will be up in a chair the evening of surgery and sipping on clear liquids, once released by your surgeon. 2. Beginning the day of surgery, you will be out of the bed as able, once released by your hospital team. We encourage you to be sitting up in a chair, walking in the rider, doing exercises as advised by physical therapy, etc.       3. You will be given scheduled non-narcotic pain medication to help keep your pain under control. You will have stronger pain medication ordered for break through pain. 4. All of these measures are geared toward improving your overall surgical recovery and   decreasing the risk of complications.
spirometer when you aren't using it

## 2022-09-09 NOTE — H&P
MD AIRAM [address] [facility]                                  [place] [address]                                  Phone [phone], Fax [phone]    History and Physical    Patient: Reji Salazar MRN: [id number]  SSN: [id number]    YOB: 1975  Age: 48 y.o. Sex: female              PCP: DEBBIE Jones - NP   Date:  9/9/2022    Consult Date: 06/20/2022    Bariatric Procedure of Interest: laparoscopic sleeve gastrectomy    Reji Salazar returns to the [facility] after successful completion of our multidisciplinary surgical prep program.  This is her final consultation preparing her for sleeve gastrectomy surgery. She presents with a height of 5' 3.5\" (1.613 m) and weight of 292 lb (132.5 kg), giving her a There is no height or weight on file to calculate BMI. She has an ideal body weight of 143 lbs, and excess body weight of 149 lbs. She started our program with a weight of 293 lbs, losing 1 lbs. 30-day Bariatric Surgical Risk Percentage: 3.18%    She has completed all aspects of our prep program and has been deemed an acceptable candidate for bariatric surgery. PSYCHOLOGICAL EVALUATION:   Completed, 08/04/2022 with Venessa Grewal deeming her an appropriate surgical candidate. DIETITIAN EVALUATION:  Completed with Marie Hinton, RD, LD deeming her an appropriate surgical candidate. BARIATRIC LABS:  Completed, 07/28/2022 (vitamin D 14.7, ferritin 6)    UPPER GI:  Completed, 07/08/2022  1. Small sliding hiatal hernia. 2. Duodenal diverticulum. PHYSICAL THERAPY EVALUATION FOR MOBILITY OPTIMIZATION:  Completed, 06/29/2022    We have reviewed the procedure again today and completed our standard pre op teaching. Her questions were answered and she is ready to schedule surgery. We have discussed the procedure, diet and exercise regimens, and potential complications of surgery.   The patient understands the nature and potential complication of surgery and has the capacity to follow the post operative diet, exercise, and nutritional requirements. After review, she has signed her surgical consent today. MEDICAL HISTORY:  Morbid Obesity  Depression  Anxiety  Hx of blood transfusion  Hx of COVID-19  Hx of kidney stones     Comorbidity Yes or No   Obstructive Sleep Apnea No   Diabetes Mellitus No   Hypertension-  Number of medications= 1 Yes   Gastroesophageal Reflux  Treatment Med= Omeprazole Yes   Hyperlipidemia with medication No   Coronary Artery Disease No   Cancer No   Asthma No   Osteoarthritis No   Joint Pain Yes       Other Yes or No   Venous Stasis No   Dialysis No   Long term use of steroids or immunocompromised conditions No   On Anticoagulants No         PRIOR WEIGHT LOSS ATTEMPTS:  Reports 11-15 previous attempts including Phentermine, keto diet and low carb diet. EXERCISE ASSESSMENT:  Reports occasional exercise via walking. DENTAL ISSUES:  No dental issues with chewing. PSYCHOSOCIAL:  She notes she is  and states main support person is her , Karol Arcos. She is employed in [address] [place] [address] at [employer]. Her goal in pursuing surgical weight loss is to improve health. She reports appropriate treatment of depression and anxiety. Denies history of mental health disorders. She states she is independent in her care, can drive a car, and can ambulate without assistance.     HISTORY:  Past Medical History:   Diagnosis Date    Anxiety and depression     Manage with meds    Difficult intravenous access     In past- pt had to have a central line bc IV's blew    GERD (gastroesophageal reflux disease)     Managed with meds    Hypertension     managed with meds    PONV (postoperative nausea and vomiting)     Severe per pt    Spondylolisthesis     As a child, multiple surgeries       She denies personal or family history of problems with anesthesia, oriented cooperative white female in no acute distress. Eyes: Sclera are clear. Conjunctiva and lids within normal limits. No icterus. Ears and Nose: no gross deformities to visual inspection, gross hearing intact  Neck: Supple, trachea midline, no appreciable thyromegaly  Resp: No JVD. Breathing is  non-labored. Lungs clear to auscultation without wheezing or rhonchi   CV: RRR. No murmurs, rubs or gallops appreciated, Carotid bruits are absent  Abd: soft non-tender and non-distended without peritoneal signs. +bs    Psych:  Mood and affect appropriate. Short-term memory and understanding intact      ASSESSMENT:  Morbid obesity with a  There is no height or weight on file to calculate BMI. ready for sleeve gastrectomy surgery. PLAN:  1.  Schedule for laparoscopic, possible open, sleeve gastrectomy, in the near future. The gastric sleeve procedure is performed utilizing a stapler to remove a portion of the stomach, creating a smaller, banana-shaped tube. The gastric bypass procedure is performed by utilizing a stapler to create a smaller stomach pouch with the upper part of the stomach. The upper part of the small intestine is then divided into a tube with two ends. One end is connected to the new stomach pouch and the other end is connected to another part of the small intestine. This creates a new pathway for food, bypassing a portion of the small intestine. Intraoperative EGD may be performed during the procedure and is considered medically necessary to rule out concerns with leak, bleeding, and/or obstruction. 2.  Patient will complete our 2 week pre operative diet. 3.  Bring incentive spirometer (given with our standard instruction to use BID 2 weeks prior to surgery) to hospital on day of surgery. 4.  The patient received prescription to use after surgery for: Percocet, Zofran, and Prilosec. I went over the risks and benefits with the patient.  For risks I went over problems with bleeding, infection and anesthesia. I also noted potential problems of injury to the esophagus, liver, spleen, stomach, pancreas, small bowel and or colon. I addition, I discussed potential problems of DVT/pulmonary embolism, urinary tract infection, wound infection, myocardial infarction, stroke and the potential need to convert to an open procedure. I quoted a 1% nationwide mortality rate for this procedure. She has signed our extensive consent form which is in the chart.        Conner Infante MD  Date:  9/9/2022

## 2022-09-13 ENCOUNTER — ANESTHESIA EVENT (OUTPATIENT)
Dept: SURGERY | Age: 47
DRG: 621 | End: 2022-09-13
Payer: COMMERCIAL

## 2022-09-14 ENCOUNTER — ANESTHESIA (OUTPATIENT)
Dept: SURGERY | Age: 47
DRG: 621 | End: 2022-09-14
Payer: COMMERCIAL

## 2022-09-14 ENCOUNTER — HOSPITAL ENCOUNTER (INPATIENT)
Age: 47
LOS: 1 days | Discharge: HOME | DRG: 621 | End: 2022-09-15
Attending: SURGERY | Admitting: SURGERY
Payer: COMMERCIAL

## 2022-09-14 DIAGNOSIS — E66.01 MORBID OBESITY (HCC): ICD-10-CM

## 2022-09-14 LAB
ABO + RH BLD: NORMAL
BLOOD GROUP ANTIBODIES SERPL: NORMAL
HCG UR QL: NEGATIVE
SPECIMEN EXP DATE BLD: NORMAL

## 2022-09-14 PROCEDURE — 2709999900 HC NON-CHARGEABLE SUPPLY: Performed by: SURGERY

## 2022-09-14 PROCEDURE — 6360000002 HC RX W HCPCS: Performed by: SURGERY

## 2022-09-14 PROCEDURE — 86901 BLOOD TYPING SEROLOGIC RH(D): CPT

## 2022-09-14 PROCEDURE — 6360000002 HC RX W HCPCS: Performed by: NURSE ANESTHETIST, CERTIFIED REGISTERED

## 2022-09-14 PROCEDURE — 7100000000 HC PACU RECOVERY - FIRST 15 MIN: Performed by: SURGERY

## 2022-09-14 PROCEDURE — 6360000002 HC RX W HCPCS: Performed by: PHYSICIAN ASSISTANT

## 2022-09-14 PROCEDURE — 1100000000 HC RM PRIVATE

## 2022-09-14 PROCEDURE — 2720000010 HC SURG SUPPLY STERILE: Performed by: SURGERY

## 2022-09-14 PROCEDURE — 2580000003 HC RX 258: Performed by: ANESTHESIOLOGY

## 2022-09-14 PROCEDURE — 7100000001 HC PACU RECOVERY - ADDTL 15 MIN: Performed by: SURGERY

## 2022-09-14 PROCEDURE — 2500000003 HC RX 250 WO HCPCS: Performed by: PHYSICIAN ASSISTANT

## 2022-09-14 PROCEDURE — A4216 STERILE WATER/SALINE, 10 ML: HCPCS | Performed by: PHYSICIAN ASSISTANT

## 2022-09-14 PROCEDURE — 88307 TISSUE EXAM BY PATHOLOGIST: CPT

## 2022-09-14 PROCEDURE — 6370000000 HC RX 637 (ALT 250 FOR IP): Performed by: PHYSICIAN ASSISTANT

## 2022-09-14 PROCEDURE — 3600000015 HC SURGERY LEVEL 5 ADDTL 15MIN: Performed by: SURGERY

## 2022-09-14 PROCEDURE — 2500000003 HC RX 250 WO HCPCS: Performed by: NURSE ANESTHETIST, CERTIFIED REGISTERED

## 2022-09-14 PROCEDURE — 6370000000 HC RX 637 (ALT 250 FOR IP): Performed by: SURGERY

## 2022-09-14 PROCEDURE — 3700000000 HC ANESTHESIA ATTENDED CARE: Performed by: SURGERY

## 2022-09-14 PROCEDURE — 88312 SPECIAL STAINS GROUP 1: CPT

## 2022-09-14 PROCEDURE — 2580000003 HC RX 258: Performed by: PHYSICIAN ASSISTANT

## 2022-09-14 PROCEDURE — 6360000002 HC RX W HCPCS: Performed by: ANESTHESIOLOGY

## 2022-09-14 PROCEDURE — 81025 URINE PREGNANCY TEST: CPT

## 2022-09-14 PROCEDURE — 3600000005 HC SURGERY LEVEL 5 BASE: Performed by: SURGERY

## 2022-09-14 PROCEDURE — 3700000001 HC ADD 15 MINUTES (ANESTHESIA): Performed by: SURGERY

## 2022-09-14 PROCEDURE — 0DB64Z3 EXCISION OF STOMACH, PERCUTANEOUS ENDOSCOPIC APPROACH, VERTICAL: ICD-10-PCS | Performed by: SURGERY

## 2022-09-14 PROCEDURE — 2500000003 HC RX 250 WO HCPCS: Performed by: SURGERY

## 2022-09-14 PROCEDURE — 43775 LAP SLEEVE GASTRECTOMY: CPT | Performed by: SURGERY

## 2022-09-14 PROCEDURE — C1713 ANCHOR/SCREW BN/BN,TIS/BN: HCPCS | Performed by: SURGERY

## 2022-09-14 PROCEDURE — C9113 INJ PANTOPRAZOLE SODIUM, VIA: HCPCS | Performed by: PHYSICIAN ASSISTANT

## 2022-09-14 RX ORDER — LIDOCAINE HYDROCHLORIDE ANHYDROUS AND DEXTROSE MONOHYDRATE .4; 5 G/100ML; G/100ML
1 INJECTION, SOLUTION INTRAVENOUS CONTINUOUS
Status: DISPENSED | OUTPATIENT
Start: 2022-09-14 | End: 2022-09-15

## 2022-09-14 RX ORDER — SUCRALFATE 1 G/1
1 TABLET ORAL EVERY 6 HOURS SCHEDULED
Status: DISCONTINUED | OUTPATIENT
Start: 2022-09-14 | End: 2022-09-15 | Stop reason: HOSPADM

## 2022-09-14 RX ORDER — APREPITANT 40 MG/1
40 CAPSULE ORAL ONCE
Status: DISCONTINUED | OUTPATIENT
Start: 2022-09-14 | End: 2022-09-14 | Stop reason: HOSPADM

## 2022-09-14 RX ORDER — SODIUM CHLORIDE, SODIUM LACTATE, POTASSIUM CHLORIDE, CALCIUM CHLORIDE 600; 310; 30; 20 MG/100ML; MG/100ML; MG/100ML; MG/100ML
INJECTION, SOLUTION INTRAVENOUS CONTINUOUS
Status: DISCONTINUED | OUTPATIENT
Start: 2022-09-14 | End: 2022-09-14 | Stop reason: HOSPADM

## 2022-09-14 RX ORDER — HALOPERIDOL 5 MG/ML
1 INJECTION INTRAMUSCULAR
Status: DISCONTINUED | OUTPATIENT
Start: 2022-09-14 | End: 2022-09-14 | Stop reason: HOSPADM

## 2022-09-14 RX ORDER — PROCHLORPERAZINE EDISYLATE 5 MG/ML
5 INJECTION INTRAMUSCULAR; INTRAVENOUS
Status: COMPLETED | OUTPATIENT
Start: 2022-09-14 | End: 2022-09-14

## 2022-09-14 RX ORDER — SODIUM CHLORIDE 0.9 % (FLUSH) 0.9 %
5-40 SYRINGE (ML) INJECTION EVERY 12 HOURS SCHEDULED
Status: DISCONTINUED | OUTPATIENT
Start: 2022-09-14 | End: 2022-09-15 | Stop reason: HOSPADM

## 2022-09-14 RX ORDER — LIDOCAINE HYDROCHLORIDE 10 MG/ML
1 INJECTION, SOLUTION INFILTRATION; PERINEURAL
Status: DISCONTINUED | OUTPATIENT
Start: 2022-09-14 | End: 2022-09-14 | Stop reason: HOSPADM

## 2022-09-14 RX ORDER — SODIUM CHLORIDE 9 MG/ML
INJECTION, SOLUTION INTRAVENOUS PRN
Status: DISCONTINUED | OUTPATIENT
Start: 2022-09-14 | End: 2022-09-14 | Stop reason: HOSPADM

## 2022-09-14 RX ORDER — LIDOCAINE HYDROCHLORIDE 20 MG/ML
INJECTION, SOLUTION EPIDURAL; INFILTRATION; INTRACAUDAL; PERINEURAL PRN
Status: DISCONTINUED | OUTPATIENT
Start: 2022-09-14 | End: 2022-09-14 | Stop reason: SDUPTHER

## 2022-09-14 RX ORDER — FENTANYL CITRATE 50 UG/ML
INJECTION, SOLUTION INTRAMUSCULAR; INTRAVENOUS PRN
Status: DISCONTINUED | OUTPATIENT
Start: 2022-09-14 | End: 2022-09-14 | Stop reason: SDUPTHER

## 2022-09-14 RX ORDER — SCOLOPAMINE TRANSDERMAL SYSTEM 1 MG/1
1 PATCH, EXTENDED RELEASE TRANSDERMAL
Status: DISCONTINUED | OUTPATIENT
Start: 2022-09-14 | End: 2022-09-14 | Stop reason: HOSPADM

## 2022-09-14 RX ORDER — HYDROMORPHONE HYDROCHLORIDE 1 MG/ML
0.25 INJECTION, SOLUTION INTRAMUSCULAR; INTRAVENOUS; SUBCUTANEOUS EVERY 5 MIN PRN
Status: DISCONTINUED | OUTPATIENT
Start: 2022-09-14 | End: 2022-09-14 | Stop reason: HOSPADM

## 2022-09-14 RX ORDER — DEXTROSE MONOHYDRATE 100 MG/ML
INJECTION, SOLUTION INTRAVENOUS CONTINUOUS PRN
Status: DISCONTINUED | OUTPATIENT
Start: 2022-09-14 | End: 2022-09-14 | Stop reason: HOSPADM

## 2022-09-14 RX ORDER — KETOROLAC TROMETHAMINE 30 MG/ML
30 INJECTION, SOLUTION INTRAMUSCULAR; INTRAVENOUS EVERY 6 HOURS
Status: COMPLETED | OUTPATIENT
Start: 2022-09-14 | End: 2022-09-15

## 2022-09-14 RX ORDER — PROCHLORPERAZINE EDISYLATE 5 MG/ML
10 INJECTION INTRAMUSCULAR; INTRAVENOUS EVERY 6 HOURS PRN
Status: DISCONTINUED | OUTPATIENT
Start: 2022-09-14 | End: 2022-09-15 | Stop reason: HOSPADM

## 2022-09-14 RX ORDER — GABAPENTIN 300 MG/1
300 CAPSULE ORAL 3 TIMES DAILY
Status: DISCONTINUED | OUTPATIENT
Start: 2022-09-14 | End: 2022-09-15 | Stop reason: HOSPADM

## 2022-09-14 RX ORDER — SODIUM CHLORIDE 0.9 % (FLUSH) 0.9 %
5-40 SYRINGE (ML) INJECTION EVERY 12 HOURS SCHEDULED
Status: DISCONTINUED | OUTPATIENT
Start: 2022-09-14 | End: 2022-09-14 | Stop reason: HOSPADM

## 2022-09-14 RX ORDER — SODIUM CHLORIDE 0.9 % (FLUSH) 0.9 %
5-40 SYRINGE (ML) INJECTION PRN
Status: DISCONTINUED | OUTPATIENT
Start: 2022-09-14 | End: 2022-09-14 | Stop reason: SDUPTHER

## 2022-09-14 RX ORDER — HYDROMORPHONE HYDROCHLORIDE 1 MG/ML
0.5 INJECTION, SOLUTION INTRAMUSCULAR; INTRAVENOUS; SUBCUTANEOUS EVERY 5 MIN PRN
Status: COMPLETED | OUTPATIENT
Start: 2022-09-14 | End: 2022-09-14

## 2022-09-14 RX ORDER — SODIUM CHLORIDE, SODIUM LACTATE, POTASSIUM CHLORIDE, CALCIUM CHLORIDE 600; 310; 30; 20 MG/100ML; MG/100ML; MG/100ML; MG/100ML
INJECTION, SOLUTION INTRAVENOUS CONTINUOUS
Status: DISCONTINUED | OUTPATIENT
Start: 2022-09-14 | End: 2022-09-14

## 2022-09-14 RX ORDER — SODIUM CHLORIDE 0.9 % (FLUSH) 0.9 %
5-40 SYRINGE (ML) INJECTION PRN
Status: DISCONTINUED | OUTPATIENT
Start: 2022-09-14 | End: 2022-09-15 | Stop reason: HOSPADM

## 2022-09-14 RX ORDER — SODIUM CHLORIDE 0.9 % (FLUSH) 0.9 %
5-40 SYRINGE (ML) INJECTION PRN
Status: DISCONTINUED | OUTPATIENT
Start: 2022-09-14 | End: 2022-09-14 | Stop reason: HOSPADM

## 2022-09-14 RX ORDER — DIPHENHYDRAMINE HYDROCHLORIDE 50 MG/ML
12.5 INJECTION INTRAMUSCULAR; INTRAVENOUS
Status: DISCONTINUED | OUTPATIENT
Start: 2022-09-14 | End: 2022-09-14 | Stop reason: HOSPADM

## 2022-09-14 RX ORDER — APREPITANT 40 MG/1
40 CAPSULE ORAL ONCE
Status: COMPLETED | OUTPATIENT
Start: 2022-09-14 | End: 2022-09-14

## 2022-09-14 RX ORDER — SIMETHICONE 80 MG
80 TABLET,CHEWABLE ORAL EVERY 6 HOURS PRN
Status: DISCONTINUED | OUTPATIENT
Start: 2022-09-14 | End: 2022-09-15 | Stop reason: HOSPADM

## 2022-09-14 RX ORDER — BUPIVACAINE HYDROCHLORIDE 5 MG/ML
INJECTION, SOLUTION PERINEURAL PRN
Status: DISCONTINUED | OUTPATIENT
Start: 2022-09-14 | End: 2022-09-14 | Stop reason: ALTCHOICE

## 2022-09-14 RX ORDER — METOCLOPRAMIDE HYDROCHLORIDE 5 MG/ML
10 INJECTION INTRAMUSCULAR; INTRAVENOUS ONCE
Status: DISCONTINUED | OUTPATIENT
Start: 2022-09-14 | End: 2022-09-14 | Stop reason: HOSPADM

## 2022-09-14 RX ORDER — SCOLOPAMINE TRANSDERMAL SYSTEM 1 MG/1
1 PATCH, EXTENDED RELEASE TRANSDERMAL
Status: DISCONTINUED | OUTPATIENT
Start: 2022-09-14 | End: 2022-09-14

## 2022-09-14 RX ORDER — SODIUM CHLORIDE AND POTASSIUM CHLORIDE .9; .15 G/100ML; G/100ML
SOLUTION INTRAVENOUS CONTINUOUS
Status: DISCONTINUED | OUTPATIENT
Start: 2022-09-14 | End: 2022-09-15 | Stop reason: HOSPADM

## 2022-09-14 RX ORDER — HEPARIN SODIUM 5000 [USP'U]/ML
5000 INJECTION, SOLUTION INTRAVENOUS; SUBCUTANEOUS EVERY 8 HOURS SCHEDULED
Status: DISCONTINUED | OUTPATIENT
Start: 2022-09-14 | End: 2022-09-15 | Stop reason: HOSPADM

## 2022-09-14 RX ORDER — ACETAMINOPHEN 500 MG
1000 TABLET ORAL ONCE
Status: DISCONTINUED | OUTPATIENT
Start: 2022-09-14 | End: 2022-09-14 | Stop reason: HOSPADM

## 2022-09-14 RX ORDER — ACETAMINOPHEN 500 MG
1000 TABLET ORAL EVERY 6 HOURS
Status: DISCONTINUED | OUTPATIENT
Start: 2022-09-14 | End: 2022-09-15 | Stop reason: HOSPADM

## 2022-09-14 RX ORDER — SCOLOPAMINE TRANSDERMAL SYSTEM 1 MG/1
1 PATCH, EXTENDED RELEASE TRANSDERMAL ONCE
Status: DISCONTINUED | OUTPATIENT
Start: 2022-09-14 | End: 2022-09-14 | Stop reason: SDUPTHER

## 2022-09-14 RX ORDER — APREPITANT 40 MG/1
40 CAPSULE ORAL ONCE
Status: DISCONTINUED | OUTPATIENT
Start: 2022-09-14 | End: 2022-09-14 | Stop reason: SDUPTHER

## 2022-09-14 RX ORDER — HYDRALAZINE HYDROCHLORIDE 20 MG/ML
10 INJECTION INTRAMUSCULAR; INTRAVENOUS EVERY 6 HOURS PRN
Status: DISCONTINUED | OUTPATIENT
Start: 2022-09-14 | End: 2022-09-15 | Stop reason: HOSPADM

## 2022-09-14 RX ORDER — OXYCODONE HYDROCHLORIDE 5 MG/1
5 TABLET ORAL
Status: DISCONTINUED | OUTPATIENT
Start: 2022-09-14 | End: 2022-09-14 | Stop reason: HOSPADM

## 2022-09-14 RX ORDER — METOCLOPRAMIDE HYDROCHLORIDE 5 MG/ML
10 INJECTION INTRAMUSCULAR; INTRAVENOUS ONCE
Status: COMPLETED | OUTPATIENT
Start: 2022-09-14 | End: 2022-09-14

## 2022-09-14 RX ORDER — OXYCODONE HYDROCHLORIDE 5 MG/1
5 TABLET ORAL EVERY 4 HOURS PRN
Status: DISCONTINUED | OUTPATIENT
Start: 2022-09-14 | End: 2022-09-15 | Stop reason: HOSPADM

## 2022-09-14 RX ORDER — MIDAZOLAM HYDROCHLORIDE 2 MG/2ML
2 INJECTION, SOLUTION INTRAMUSCULAR; INTRAVENOUS
Status: DISCONTINUED | OUTPATIENT
Start: 2022-09-14 | End: 2022-09-14 | Stop reason: HOSPADM

## 2022-09-14 RX ORDER — PROPOFOL 10 MG/ML
INJECTION, EMULSION INTRAVENOUS PRN
Status: DISCONTINUED | OUTPATIENT
Start: 2022-09-14 | End: 2022-09-14 | Stop reason: SDUPTHER

## 2022-09-14 RX ORDER — ACETAMINOPHEN 500 MG
1000 TABLET ORAL EVERY 6 HOURS PRN
COMMUNITY

## 2022-09-14 RX ORDER — ACETAMINOPHEN 500 MG
1000 TABLET ORAL ONCE
Status: COMPLETED | OUTPATIENT
Start: 2022-09-14 | End: 2022-09-14

## 2022-09-14 RX ORDER — SODIUM CHLORIDE 9 MG/ML
INJECTION, SOLUTION INTRAVENOUS PRN
Status: DISCONTINUED | OUTPATIENT
Start: 2022-09-14 | End: 2022-09-15 | Stop reason: HOSPADM

## 2022-09-14 RX ORDER — DIPHENHYDRAMINE HYDROCHLORIDE 50 MG/ML
25 INJECTION INTRAMUSCULAR; INTRAVENOUS EVERY 6 HOURS PRN
Status: DISCONTINUED | OUTPATIENT
Start: 2022-09-14 | End: 2022-09-15 | Stop reason: HOSPADM

## 2022-09-14 RX ORDER — DIPHENHYDRAMINE HCL 25 MG
25 CAPSULE ORAL EVERY 6 HOURS PRN
Status: DISCONTINUED | OUTPATIENT
Start: 2022-09-14 | End: 2022-09-15 | Stop reason: HOSPADM

## 2022-09-14 RX ORDER — LIDOCAINE HYDROCHLORIDE ANHYDROUS AND DEXTROSE MONOHYDRATE .4; 5 G/100ML; G/100ML
INJECTION, SOLUTION INTRAVENOUS CONTINUOUS PRN
Status: DISCONTINUED | OUTPATIENT
Start: 2022-09-14 | End: 2022-09-14 | Stop reason: SDUPTHER

## 2022-09-14 RX ORDER — EPHEDRINE SULFATE/0.9% NACL/PF 50 MG/5 ML
SYRINGE (ML) INTRAVENOUS PRN
Status: DISCONTINUED | OUTPATIENT
Start: 2022-09-14 | End: 2022-09-14 | Stop reason: SDUPTHER

## 2022-09-14 RX ORDER — ONDANSETRON 2 MG/ML
INJECTION INTRAMUSCULAR; INTRAVENOUS PRN
Status: DISCONTINUED | OUTPATIENT
Start: 2022-09-14 | End: 2022-09-14 | Stop reason: SDUPTHER

## 2022-09-14 RX ORDER — ONDANSETRON 2 MG/ML
4 INJECTION INTRAMUSCULAR; INTRAVENOUS ONCE
Status: DISCONTINUED | OUTPATIENT
Start: 2022-09-14 | End: 2022-09-14 | Stop reason: HOSPADM

## 2022-09-14 RX ORDER — SODIUM CHLORIDE 9 MG/ML
INJECTION, SOLUTION INTRAVENOUS PRN
Status: DISCONTINUED | OUTPATIENT
Start: 2022-09-14 | End: 2022-09-14 | Stop reason: SDUPTHER

## 2022-09-14 RX ORDER — HYDROMORPHONE HYDROCHLORIDE 1 MG/ML
0.5 INJECTION, SOLUTION INTRAMUSCULAR; INTRAVENOUS; SUBCUTANEOUS
Status: DISCONTINUED | OUTPATIENT
Start: 2022-09-14 | End: 2022-09-15 | Stop reason: HOSPADM

## 2022-09-14 RX ORDER — KETAMINE HYDROCHLORIDE 50 MG/ML
INJECTION, SOLUTION, CONCENTRATE INTRAMUSCULAR; INTRAVENOUS PRN
Status: DISCONTINUED | OUTPATIENT
Start: 2022-09-14 | End: 2022-09-14 | Stop reason: SDUPTHER

## 2022-09-14 RX ORDER — SODIUM CHLORIDE 0.9 % (FLUSH) 0.9 %
5-40 SYRINGE (ML) INJECTION EVERY 12 HOURS SCHEDULED
Status: DISCONTINUED | OUTPATIENT
Start: 2022-09-14 | End: 2022-09-14 | Stop reason: SDUPTHER

## 2022-09-14 RX ORDER — MIDAZOLAM HYDROCHLORIDE 2 MG/2ML
2 INJECTION, SOLUTION INTRAMUSCULAR; INTRAVENOUS ONCE
Status: COMPLETED | OUTPATIENT
Start: 2022-09-14 | End: 2022-09-14

## 2022-09-14 RX ORDER — ROCURONIUM BROMIDE 10 MG/ML
INJECTION, SOLUTION INTRAVENOUS PRN
Status: DISCONTINUED | OUTPATIENT
Start: 2022-09-14 | End: 2022-09-14 | Stop reason: SDUPTHER

## 2022-09-14 RX ORDER — ONDANSETRON 2 MG/ML
4 INJECTION INTRAMUSCULAR; INTRAVENOUS EVERY 6 HOURS PRN
Status: DISCONTINUED | OUTPATIENT
Start: 2022-09-14 | End: 2022-09-15 | Stop reason: HOSPADM

## 2022-09-14 RX ADMIN — SUCRALFATE 1 G: 1 TABLET ORAL at 18:55

## 2022-09-14 RX ADMIN — ONDANSETRON 4 MG: 2 INJECTION INTRAMUSCULAR; INTRAVENOUS at 13:30

## 2022-09-14 RX ADMIN — PHENYLEPHRINE HYDROCHLORIDE 100 MCG: 0.1 INJECTION, SOLUTION INTRAVENOUS at 13:34

## 2022-09-14 RX ADMIN — KETOROLAC TROMETHAMINE 30 MG: 30 INJECTION, SOLUTION INTRAMUSCULAR at 21:41

## 2022-09-14 RX ADMIN — PROCHLORPERAZINE EDISYLATE 5 MG: 5 INJECTION INTRAMUSCULAR; INTRAVENOUS at 14:22

## 2022-09-14 RX ADMIN — Medication 3000 MG: at 12:44

## 2022-09-14 RX ADMIN — KETOROLAC TROMETHAMINE 30 MG: 30 INJECTION, SOLUTION INTRAMUSCULAR at 17:22

## 2022-09-14 RX ADMIN — SODIUM CHLORIDE, POTASSIUM CHLORIDE, SODIUM LACTATE AND CALCIUM CHLORIDE 125 ML/HR: 600; 310; 30; 20 INJECTION, SOLUTION INTRAVENOUS at 12:03

## 2022-09-14 RX ADMIN — PHENYLEPHRINE HYDROCHLORIDE 100 MCG: 0.1 INJECTION, SOLUTION INTRAVENOUS at 13:27

## 2022-09-14 RX ADMIN — FOLIC ACID: 5 INJECTION, SOLUTION INTRAMUSCULAR; INTRAVENOUS; SUBCUTANEOUS at 18:55

## 2022-09-14 RX ADMIN — LIDOCAINE HYDROCHLORIDE 100 MG: 20 INJECTION, SOLUTION EPIDURAL; INFILTRATION; INTRACAUDAL; PERINEURAL at 12:50

## 2022-09-14 RX ADMIN — SODIUM CHLORIDE, POTASSIUM CHLORIDE, SODIUM LACTATE AND CALCIUM CHLORIDE: 600; 310; 30; 20 INJECTION, SOLUTION INTRAVENOUS at 14:27

## 2022-09-14 RX ADMIN — HYDROMORPHONE HYDROCHLORIDE 0.5 MG: 1 INJECTION, SOLUTION INTRAMUSCULAR; INTRAVENOUS; SUBCUTANEOUS at 14:40

## 2022-09-14 RX ADMIN — ACETAMINOPHEN 1000 MG: 500 TABLET, FILM COATED ORAL at 18:55

## 2022-09-14 RX ADMIN — METOCLOPRAMIDE 10 MG: 5 INJECTION, SOLUTION INTRAMUSCULAR; INTRAVENOUS at 12:12

## 2022-09-14 RX ADMIN — PROPOFOL 200 MG: 10 INJECTION, EMULSION INTRAVENOUS at 12:50

## 2022-09-14 RX ADMIN — Medication 10 MG: at 13:35

## 2022-09-14 RX ADMIN — ONDANSETRON 4 MG: 2 INJECTION INTRAMUSCULAR; INTRAVENOUS at 21:41

## 2022-09-14 RX ADMIN — LIDOCAINE HYDROCHLORIDE 13 MG/KG/HR: 4 INJECTION, SOLUTION INTRAVENOUS at 12:55

## 2022-09-14 RX ADMIN — SUGAMMADEX 200 MG: 100 INJECTION, SOLUTION INTRAVENOUS at 13:48

## 2022-09-14 RX ADMIN — SODIUM CHLORIDE, PRESERVATIVE FREE 10 ML: 5 INJECTION INTRAVENOUS at 21:42

## 2022-09-14 RX ADMIN — SODIUM CHLORIDE 40 MG: 9 INJECTION, SOLUTION INTRAMUSCULAR; INTRAVENOUS; SUBCUTANEOUS at 17:22

## 2022-09-14 RX ADMIN — FENTANYL CITRATE 100 MCG: 50 INJECTION, SOLUTION INTRAMUSCULAR; INTRAVENOUS at 12:50

## 2022-09-14 RX ADMIN — MIDAZOLAM HYDROCHLORIDE 2 MG: 1 INJECTION, SOLUTION INTRAMUSCULAR; INTRAVENOUS at 12:33

## 2022-09-14 RX ADMIN — ROCURONIUM BROMIDE 50 MG: 50 INJECTION, SOLUTION INTRAVENOUS at 12:50

## 2022-09-14 RX ADMIN — POTASSIUM CHLORIDE AND SODIUM CHLORIDE: 900; 150 INJECTION, SOLUTION INTRAVENOUS at 17:22

## 2022-09-14 RX ADMIN — KETAMINE HYDROCHLORIDE 40 MG: 50 INJECTION, SOLUTION INTRAMUSCULAR; INTRAVENOUS at 12:50

## 2022-09-14 RX ADMIN — PHENYLEPHRINE HYDROCHLORIDE 100 MCG: 0.1 INJECTION, SOLUTION INTRAVENOUS at 13:12

## 2022-09-14 RX ADMIN — HEPARIN SODIUM 5000 UNITS: 5000 INJECTION INTRAVENOUS; SUBCUTANEOUS at 21:41

## 2022-09-14 RX ADMIN — APREPITANT 40 MG: 40 CAPSULE ORAL at 12:11

## 2022-09-14 RX ADMIN — GABAPENTIN 300 MG: 300 CAPSULE ORAL at 21:41

## 2022-09-14 RX ADMIN — HYDROMORPHONE HYDROCHLORIDE 0.5 MG: 1 INJECTION, SOLUTION INTRAMUSCULAR; INTRAVENOUS; SUBCUTANEOUS at 14:22

## 2022-09-14 RX ADMIN — ACETAMINOPHEN 1000 MG: 500 TABLET, FILM COATED ORAL at 12:11

## 2022-09-14 ASSESSMENT — PAIN SCALES - GENERAL
PAINLEVEL_OUTOF10: 9
PAINLEVEL_OUTOF10: 9
PAINLEVEL_OUTOF10: 4
PAINLEVEL_OUTOF10: 0

## 2022-09-14 ASSESSMENT — PAIN DESCRIPTION - LOCATION
LOCATION: ABDOMEN

## 2022-09-14 ASSESSMENT — PAIN DESCRIPTION - DESCRIPTORS: DESCRIPTORS: ACHING

## 2022-09-14 ASSESSMENT — PAIN - FUNCTIONAL ASSESSMENT: PAIN_FUNCTIONAL_ASSESSMENT: 0-10

## 2022-09-14 NOTE — PERIOP NOTE
TRANSFER - OUT REPORT:    Verbal report given to [facility] RN on Macey Spencer  being transferred to [facility] for routine post-op       Report consisted of patient's Situation, Background, Assessment and   Recommendations(SBAR). Information from the following report(s) Nurse Handoff Report, Surgery Report, and MAR was reviewed with the receiving nurse. Lines:   Peripheral IV 09/14/22 Right;Posterior Hand (Active)   Site Assessment Clean, dry & intact 09/14/22 1504   Line Status Infusing 09/14/22 1504   Phlebitis Assessment No symptoms 09/14/22 1504   Infiltration Assessment 0 09/14/22 1504   Dressing Status Clean, dry & intact 09/14/22 1504   Dressing Type Transparent 09/14/22 1504        Opportunity for questions and clarification was provided.       Patient transported with:  O2 @ 2lpm and Tech

## 2022-09-14 NOTE — ANESTHESIA POSTPROCEDURE EVALUATION
Department of Anesthesiology  Postprocedure Note    Patient: Jewell Garcia  MRN: [id number]  YOB: 1975  Date of evaluation: 9/14/2022      Procedure Summary     Date: 09/14/22 Room / Location: [facility] MAIN OR 02 / [facility] MAIN OR    Anesthesia Start: 1244 Anesthesia Stop: 8978    Procedure: ERAS/ GASTRECTOMY SLEEVE LAPAROSCOPIC (Abdomen) Diagnosis:       Morbid obesity ([address]      (Morbid obesity ([facility] [address] [E66.01])    Surgeons: Nichol Nava MD Responsible Provider: Jacqueline Pathak MD    Anesthesia Type: general ASA Status: 4          Anesthesia Type: No value filed.     Bianka Phase I: Bianka Score: 7    Bianka Phase II:        Anesthesia Post Evaluation    Patient location during evaluation: PACU  Patient participation: complete - patient participated  Level of consciousness: awake and alert  Pain score: 2  Airway patency: patent  Nausea & Vomiting: no nausea and no vomiting  Complications: no  Cardiovascular status: blood pressure returned to baseline  Respiratory status: acceptable  Hydration status: euvolemic  Comments: BP (!) 151/81   Pulse 59   Temp 98.6 °F (37 °C) (Infrared)   Resp 16   Ht 5' 3.5\" (1.613 m)   Wt 290 lb 6.4 oz (131.7 kg)   SpO2 96%   BMI 50.64 kg/m²   Multimodal analgesia pain management approach

## 2022-09-14 NOTE — INTERVAL H&P NOTE
Update History & Physical    The patient's History and Physical of 9/9/22 was reviewed with the patient and I examined the patient. There was no change. The surgical site was confirmed by the patient and me. Plan: The risks, benefits, expected outcome, and alternative to the recommended procedure have been discussed with the patient. Patient understands and wants to proceed with the procedure.      Electronically signed by Cristina Ramey MD on 9/14/2022 at 11:05 AM

## 2022-09-14 NOTE — OP NOTE
Laparascopic Sleeve Gastrectomy Operative Report    Date of Surgery: [unfilled]     Preoperative Diagnosis: Morbid Obesity with a BMI of 51    Postoperative Diagnosis: Same as the above    Procedure: Procedure(s):  ERAS/ GASTRECTOMY SLEEVE LAPAROSCOPIC     Surgeon(s) and Role:     * Valeri Junior MD - Primary     Anesthesia:  GETA plus local     Surgical Assistant: NORTH Lal. Her expertise with an angled laparoscope, experience with bariatric procedures, retraction and help with closure were essential for this procedure. NAME OF PROCEDURE: LAPAROSCOPIC SLEEVE GASTRECTOMY    ESTIMATED BLOOD LOSS: 50mL. SPECIMENS: Segment of the stomach. HISTORY: This is a 48 y.o. female who came to the surgical weight loss  clinic at [facility] of her own volition for consideration of bariatric surgery. The patient went to an information session, met with the dietician and psychologist. she came in and I discussed a gastric bypass versus a sleeve gastrectomy with the patient. I recommended a sleeve gastrectomy. The  patient agreed and signed a consent form. For risks, I mentioned risks of bleeding, infection, anesthesia, injury to the esophagus, stomach, small bowel, liver, spleen, large bowel. I also went through risks of myocardial infarction, pneumonia and leak from the staple line of the sleeve gastrectomy. I said that a leak could produce peritonitis, multi-system organ failure, and even death. There is a 1% nationwide mortality rate for this procedure. The patient understood and still wished to proceed. PROCEDURE IN DETAIL: The patient was brought to an operating room at the [address] [place] [address] where general endotracheal anesthesia was administered without complications. She received 2 grams of Ancef as prophylactic antibiotic coverage. The nursing staff applied sequential compression devices which were used throughout the procedure. The abdomen was prepped and draped in the usual sterile manner. An incision was made superior into the patient's left of the umbilicus overlying the left rectus abdominis muscle. An Optiview trocar was placed in the peritoneal cavity under direct vision with 0 degree 10 mm scope. Once in the peritoneal cavity, the abdomen was insufflated to 15 mmHg using carbon dioxide gas. The table was placed in reverse Trendelenburg position. A 5 and 15 mm trocar placed in the right upper quadrant under direct vision. A 5 mm trocar was placed in the epigastric region and then removed. Through this tract, a Roberth liver retractor was placed and used to retract the left lobe of the liver throughout the remainder of the procedure. It was attached to an Omni self-retaining retracting device. A 5 mm trocar was placed in the left upper quadrant to be used for retraction throughout the procedure. We found the pylorus and measured 6 cm proximal to the pylorus in an area along the greater curvature. The nurse anesthetist placed a 40 Fr Visi-G bougie/calibration tube which was manipulated along the lesser curvature. The device was attached to suction which fixed it in place. We made a small aperture with the Enseal Trio device. We then took the gastrocolic ligament from this point all the way up to the left joe of the diaphragm. We cleared off some posterior adhesions with the Enseal device as well. We then used the [place] [address] stapling device. The first cartridge was a black staple cartridge. We were able to push the bougie into the pre-pyloric channel and along the lesser curvature, where it remained. The next  staple cartridge was green followed by blue cartridges until the stomach was completely divided along the calibration tube. We checked the staple line. The remnant of the stomach had a stitch of 0 Ethibond placed in the antrum area and the excised stomach was then placed into a large [employer].  The Endopouch was closed and brought up through the 15 mm trocar incision which was a enlarged with the electrocautery. The bag was removed including the excised stomach which was sent to pathology. The 15 mm trocar was returned. We checked the staple line again and there was no evidence of bleeding. The bougie tube was removed and then replaced. It passed easily from the distal esophagus into the proximal stomach and was advanced back into the pre-pyloric channel under direct vision. There was no evidence of any staple line leak or bleeding. There was  evidence of any bleeding along the staple line which was stopped with 10 mm clips and [place] Seal, hemostatic agent. The bougie was removed for the last time. We removed all the trocars under direct vision with no evidence of bleeding from the trocar sites. The fascia of the 15 mm trocar was closed with interrupted sutures of 0 Vicryl. The skin was closed with subcuticular sutures of 4-0 Monocryl. The patient received 30 mL 0.5% Marcaine in 5 mL doses to each incision site. Mastisol and Steri-Strips were placed over the wounds. The patient tolerated procedure well. She was extubated and brought to the recovery room in stable condition.         Ezio Jimenez MD

## 2022-09-14 NOTE — ANESTHESIA PRE PROCEDURE
Department of Anesthesiology  Preprocedure Note       Name:  Ronny Casas   Age:  48 y.o.  :  1975                                          MRN:  [id number]         Date:  2022      Surgeon: Evie Fraire):  Lisandra Duron MD    Procedure: Procedure(s):  ERAS/ GASTRECTOMY SLEEVE LAPAROSCOPIC    Medications prior to admission:   Prior to Admission medications    Medication Sig Start Date End Date Taking?  Authorizing Provider   acetaminophen (TYLENOL) 500 MG tablet Take 1,000 mg by mouth every 6 hours as needed for Pain   Yes Historical Provider, MD   venlafaxine (EFFEXOR XR) 37.5 MG extended release capsule Take 37.5 mg by mouth daily    Historical Provider, MD   omeprazole (PRILOSEC) 40 MG delayed release capsule Take 1 capsule by mouth every morning (before breakfast) 22   NORTH Charlton   ondansetron (ZOFRAN) 4 MG tablet Take 1 tablet by mouth 3 times daily as needed for Nausea or Vomiting  Patient not taking: Reported on 2022   NORTH Charlton   vitamin D (ERGOCALCIFEROL) 1.25 MG (36885 UT) CAPS capsule Take 1 capsule by mouth once a week 22   AmyNORTH Heck   clonazePAM (KLONOPIN) 0.5 MG tablet TAKE 1/2 TO 1 TABLET BY MOUTH EVERY 8 HOURS AS NEEDED FOR ANXIETY 22   Historical Provider, MD   furosemide (LASIX) 20 MG tablet Take 20 mg by mouth daily 22   Historical Provider, MD   irbesartan (AVAPRO) 150 MG tablet TAKE 1 TABLET BY MOUTH EVERY DAY FOR BLOOD PRESSURE. STOP LOSARTAN 22   Historical Provider, MD   venlafaxine (EFFEXOR XR) 75 MG extended release capsule Take 75 mg by mouth daily Takes 75 mg and 37.5  Patient not taking: Reported on 2022   Historical Provider, MD       Current medications:    Current Facility-Administered Medications   Medication Dose Route Frequency Provider Last Rate Last Admin    lactated ringers infusion   IntraVENous Continuous NORTH Charlton        lactated ringers infusion IntraVENous Continuous Hanh Giron MD        aprepitant (EMEND) capsule 40 mg  40 mg Oral Once Noel Jeffries MD        metoclopramide [facility]) injection 10 mg  10 mg IntraVENous Once Noel Jeffries MD        scopolamine (TRANSDERM-SCOP) transdermal patch 1 patch  1 patch TransDERmal Q72H Noel Jeffries MD        acetaminophen (TYLENOL) tablet 1,000 mg  1,000 mg Oral Once Noel Jeffries MD        sodium chloride flush 0.9 % injection 5-40 mL  5-40 mL IntraVENous 2 times per day Noel Jeffries MD        sodium chloride flush 0.9 % injection 5-40 mL  5-40 mL IntraVENous PRN Noel Jeffries MD           Allergies:     Allergies   Allergen Reactions    Anesthesia S-I-40 [Propofol] Nausea And Vomiting    Hydrocodone-Acetaminophen Itching and Nausea And Vomiting       Problem List:    Patient Active Problem List   Diagnosis Code    Morbid obesity ([facility] [address] E66.01       Past Medical History:        Diagnosis Date    Anxiety and depression     Manage with meds    Difficult intravenous access     In past- pt had to have a central line bc IV's blew    GERD (gastroesophageal reflux disease)     Managed with meds    Hypertension     managed with meds    PONV (postoperative nausea and vomiting)     Severe per pt    Spondylolisthesis     As a child, multiple surgeries       Past Surgical History:        Procedure Laterality Date    ANKLE SURGERY Left     BACK SURGERY      x3- hardware and removed     SECTION      FRACTURE SURGERY      Right hand    LITHOTRIPSY      x 2       Social History:    Social History     Tobacco Use    Smoking status: Never    Smokeless tobacco: Never   Substance Use Topics    Alcohol use: Never                                Counseling given: Not Answered      Vital Signs (Current):   Vitals:    22 1157 22 1100 22 1121   BP:  (!) 167/107    Pulse:  91    Resp:  18    Temp:  97.8 °F (36.6 °C)    TempSrc:  Temporal    SpO2:  97%    Weight: 292 lb (132.5 kg) 290 lb 6.4 oz (131.7 kg)    Height: 5' 3.5\" (1.613 m)  5' 3.5\" (1.613 m)                                              BP Readings from Last 3 Encounters:   09/14/22 (!) 167/107   08/31/22 (!) 149/95   08/25/22 (!) 158/99       NPO Status: Time of last liquid consumption: 0900 (8 OZ CUP BLACK COFFEE)                        Time of last solid consumption: 2330                        Date of last liquid consumption: 09/14/22                        Date of last solid food consumption: 09/13/22    BMI:   Wt Readings from Last 3 Encounters:   09/14/22 290 lb 6.4 oz (131.7 kg)   08/31/22 292 lb 9.6 oz (132.7 kg)   08/25/22 292 lb (132.5 kg)     Body mass index is 50.64 kg/m². CBC:   Lab Results   Component Value Date/Time    HGB 13.2 08/31/2022 09:57 AM       CMP:   Lab Results   Component Value Date/Time    K 3.8 08/31/2022 09:57 AM    CREATININE 0.55 08/31/2022 09:57 AM       POC Tests: No results for input(s): POCGLU, POCNA, POCK, POCCL, POCBUN, POCHEMO, POCHCT in the last 72 hours. Coags: No results found for: PROTIME, INR, APTT    HCG (If Applicable): No results found for: PREGTESTUR, PREGSERUM, HCG, HCGQUANT     ABGs: No results found for: PHART, PO2ART, EGK7ORV, CWP6NCS, BEART, J8UZCEQX     Type & Screen (If Applicable):  No results found for: LABABO, LABRH    Drug/Infectious Status (If Applicable):  No results found for: HIV, HEPCAB    COVID-19 Screening (If Applicable): No results found for: COVID19        Anesthesia Evaluation  Patient summary reviewed and Nursing notes reviewed   history of anesthetic complications: PONV.   Airway: Mallampati: II  TM distance: >3 FB   Neck ROM: full  Mouth opening: > = 3 FB   Dental: normal exam         Pulmonary:normal exam        (-) sleep apnea                           Cardiovascular:    (+) hypertension:,                   Neuro/Psych:   (+) depression/anxiety             GI/Hepatic/Renal:   (+) GERD:, morbid obesity (BMI 51)          Endo/Other: Abdominal:   (+) obese,           Vascular: Other Findings:           Anesthesia Plan      general     ASA 4     (GETA + ketamine + lidocaine infusion)  Induction: intravenous. MIPS: Postoperative opioids intended. Anesthetic plan and risks discussed with patient.                         David Sahni MD   9/14/2022

## 2022-09-15 VITALS
TEMPERATURE: 98.2 F | DIASTOLIC BLOOD PRESSURE: 68 MMHG | WEIGHT: 290.4 LBS | RESPIRATION RATE: 16 BRPM | HEART RATE: 66 BPM | SYSTOLIC BLOOD PRESSURE: 111 MMHG | HEIGHT: 64 IN | BODY MASS INDEX: 49.58 KG/M2 | OXYGEN SATURATION: 94 %

## 2022-09-15 LAB
ANION GAP SERPL CALC-SCNC: 4 MMOL/L (ref 4–13)
BASOPHILS # BLD: 0 K/UL (ref 0–0.2)
BASOPHILS NFR BLD: 0 % (ref 0–2)
BUN SERPL-MCNC: 10 MG/DL (ref 6–23)
CALCIUM SERPL-MCNC: 8.5 MG/DL (ref 8.3–10.4)
CHLORIDE SERPL-SCNC: 109 MMOL/L (ref 101–110)
CO2 SERPL-SCNC: 28 MMOL/L (ref 21–32)
CREAT SERPL-MCNC: 0.65 MG/DL (ref 0.6–1)
DIFFERENTIAL METHOD BLD: ABNORMAL
EOSINOPHIL # BLD: 0 K/UL (ref 0–0.8)
EOSINOPHIL NFR BLD: 1 % (ref 0.5–7.8)
ERYTHROCYTE [DISTWIDTH] IN BLOOD BY AUTOMATED COUNT: 14.1 % (ref 11.9–14.6)
GLUCOSE SERPL-MCNC: 92 MG/DL (ref 65–100)
HCT VFR BLD AUTO: 38.1 % (ref 35.8–46.3)
HGB BLD-MCNC: 11.8 G/DL (ref 11.7–15.4)
IMM GRANULOCYTES # BLD AUTO: 0 K/UL (ref 0–0.5)
IMM GRANULOCYTES NFR BLD AUTO: 0 % (ref 0–5)
LYMPHOCYTES # BLD: 1.3 K/UL (ref 0.5–4.6)
LYMPHOCYTES NFR BLD: 15 % (ref 13–44)
MCH RBC QN AUTO: 26.9 PG (ref 26.1–32.9)
MCHC RBC AUTO-ENTMCNC: 31 G/DL (ref 31.4–35)
MCV RBC AUTO: 87 FL (ref 79.6–97.8)
MONOCYTES # BLD: 0.9 K/UL (ref 0.1–1.3)
MONOCYTES NFR BLD: 11 % (ref 4–12)
NEUTS SEG # BLD: 6.1 K/UL (ref 1.7–8.2)
NEUTS SEG NFR BLD: 73 % (ref 43–78)
NRBC # BLD: 0 K/UL (ref 0–0.2)
PLATELET # BLD AUTO: 240 K/UL (ref 150–450)
PMV BLD AUTO: 9.1 FL (ref 9.4–12.3)
POTASSIUM SERPL-SCNC: 3.6 MMOL/L (ref 3.5–5.1)
RBC # BLD AUTO: 4.38 M/UL (ref 4.05–5.2)
SODIUM SERPL-SCNC: 141 MMOL/L (ref 136–145)
WBC # BLD AUTO: 8.4 K/UL (ref 4.3–11.1)

## 2022-09-15 PROCEDURE — 6370000000 HC RX 637 (ALT 250 FOR IP): Performed by: PHYSICIAN ASSISTANT

## 2022-09-15 PROCEDURE — C9113 INJ PANTOPRAZOLE SODIUM, VIA: HCPCS | Performed by: PHYSICIAN ASSISTANT

## 2022-09-15 PROCEDURE — 36415 COLL VENOUS BLD VENIPUNCTURE: CPT

## 2022-09-15 PROCEDURE — APPSS30 APP SPLIT SHARED TIME 16-30 MINUTES: Performed by: PHYSICIAN ASSISTANT

## 2022-09-15 PROCEDURE — 80048 BASIC METABOLIC PNL TOTAL CA: CPT

## 2022-09-15 PROCEDURE — A4216 STERILE WATER/SALINE, 10 ML: HCPCS | Performed by: PHYSICIAN ASSISTANT

## 2022-09-15 PROCEDURE — 97530 THERAPEUTIC ACTIVITIES: CPT

## 2022-09-15 PROCEDURE — 2580000003 HC RX 258: Performed by: PHYSICIAN ASSISTANT

## 2022-09-15 PROCEDURE — 85025 COMPLETE CBC W/AUTO DIFF WBC: CPT

## 2022-09-15 PROCEDURE — 6360000002 HC RX W HCPCS: Performed by: PHYSICIAN ASSISTANT

## 2022-09-15 PROCEDURE — 97161 PT EVAL LOW COMPLEX 20 MIN: CPT

## 2022-09-15 RX ADMIN — SUCRALFATE 1 G: 1 TABLET ORAL at 00:22

## 2022-09-15 RX ADMIN — SUCRALFATE 1 G: 1 TABLET ORAL at 12:16

## 2022-09-15 RX ADMIN — SODIUM CHLORIDE, PRESERVATIVE FREE 10 ML: 5 INJECTION INTRAVENOUS at 09:31

## 2022-09-15 RX ADMIN — KETOROLAC TROMETHAMINE 30 MG: 30 INJECTION, SOLUTION INTRAMUSCULAR at 04:52

## 2022-09-15 RX ADMIN — ACETAMINOPHEN 1000 MG: 500 TABLET, FILM COATED ORAL at 05:00

## 2022-09-15 RX ADMIN — ONDANSETRON 4 MG: 2 INJECTION INTRAMUSCULAR; INTRAVENOUS at 12:16

## 2022-09-15 RX ADMIN — GABAPENTIN 300 MG: 300 CAPSULE ORAL at 09:30

## 2022-09-15 RX ADMIN — HEPARIN SODIUM 5000 UNITS: 5000 INJECTION INTRAVENOUS; SUBCUTANEOUS at 05:00

## 2022-09-15 RX ADMIN — ACETAMINOPHEN 1000 MG: 500 TABLET, FILM COATED ORAL at 12:16

## 2022-09-15 RX ADMIN — OXYCODONE 5 MG: 5 TABLET ORAL at 12:16

## 2022-09-15 RX ADMIN — SUCRALFATE 1 G: 1 TABLET ORAL at 05:01

## 2022-09-15 RX ADMIN — ACETAMINOPHEN 1000 MG: 500 TABLET, FILM COATED ORAL at 00:22

## 2022-09-15 RX ADMIN — SODIUM CHLORIDE 40 MG: 9 INJECTION, SOLUTION INTRAMUSCULAR; INTRAVENOUS; SUBCUTANEOUS at 09:30

## 2022-09-15 ASSESSMENT — PAIN DESCRIPTION - DESCRIPTORS: DESCRIPTORS: CRAMPING

## 2022-09-15 ASSESSMENT — PAIN DESCRIPTION - ORIENTATION: ORIENTATION: MID

## 2022-09-15 ASSESSMENT — PAIN SCALES - GENERAL
PAINLEVEL_OUTOF10: 0
PAINLEVEL_OUTOF10: 7

## 2022-09-15 ASSESSMENT — PAIN DESCRIPTION - LOCATION: LOCATION: ABDOMEN

## 2022-09-15 ASSESSMENT — PAIN - FUNCTIONAL ASSESSMENT: PAIN_FUNCTIONAL_ASSESSMENT: ACTIVITIES ARE NOT PREVENTED

## 2022-09-15 NOTE — PROGRESS NOTES
Inpatient CMS 0-100% Score: 0  Mobility Inpatient CMS G-Code Modifier : CH    SUBJECTIVE:   Ms. Rosario Collins states, \"I am doing good\"     Social/Functional Lives With: Spouse  Type of Home: House  Home Layout: One level    OBJECTIVE:     PAIN: [place]azm[place] / O2: Isaac Muro / Dariusz Mcclellan / Jaymie Cervantes:   Pre Treatment:    Just a little sore      Post Treatment: just a little sore Vitals        Oxygen      IV    RESTRICTIONS/PRECAUTIONS:                    GROSS EVALUATION: Intact Impaired (Comments):   AROM [x]     PROM []    Strength [x]     Balance [x]     Posture [x] N/A   Sensation [x]     Coordination [x]      Tone [x]     Edema [x]    Activity Tolerance [x]  Did get a little winded after walk but no shortness of breath    []      COGNITION/  PERCEPTION: Intact Impaired (Comments):   Orientation [x]     Vision [x]     Hearing [x]     Cognition  [x]       MOBILITY: I Mod I S SBA CGA Min Mod Max Total  NT x2 Comments:   Bed Mobility    Rolling [] [] [] [] [] [] [] [] [] [] []    Supine to Sit [x] [] [] [] [] [] [] [] [] [] []    Scooting [x] [] [] [] [] [] [] [] [] [] []    Sit to Supine [x] [] [] [] [] [] [] [] [] [] []    Transfers    Sit to Stand [x] [] [] [] [] [] [] [] [] [] []    Bed to Chair [x] [] [] [] [] [] [] [] [] [] []    Stand to Sit [x] [] [] [] [] [] [] [] [] [] []     [] [] [] [] [] [] [] [] [] [] []    I=Independent, Mod I=Modified Independent, S=Supervision, SBA=Standby Assistance, CGA=Contact Guard Assistance,   Min=Minimal Assistance, Mod=Moderate Assistance, Max=Maximal Assistance, Total=Total Assistance, NT=Not Tested    GAIT: I Mod I S SBA CGA Min Mod Max Total  NT x2 Comments:   Level of Assistance [x] [] [] [] [] [] [] [] [] [] []    Distance 650 feet    DME None    Gait Quality N/A    Weightbearing Status      Stairs      I=Independent, Mod I=Modified Independent, S=Supervision, SBA=Standby Assistance, CGA=Contact Guard Assistance,   Min=Minimal Assistance, Mod=Moderate Assistance, Max=Maximal Assistance, Total=Total Assistance, NT=Not Tested    PLAN:   ACUTE PHYSICAL THERAPY GOALS:   (Developed with and agreed upon by patient and/or caregiver.)  STG:  (1.)Ms. Carol Davila will be independent with HEP in 1-2 treatment sessions. MET    (2)Ms. Carol Davila will ambulate with INDEPENDENCE for 650 feet with the least restrictive device within 1-2 treatment day(s). ________________________________________________________________________________________________     FREQUENCY AND DURATION: Daily for duration of hospital stay or until stated goals are met, whichever comes first.    THERAPY PROGNOSIS: Good    PROBLEM LIST:   (Skilled intervention is medically necessary to address:)  N/a INTERVENTIONS PLANNED:   (Benefits and precautions of physical therapy have been discussed with the patient.)  N/a       TREATMENT:   EVALUATION: LOW COMPLEXITY: (Untimed Charge)    TREATMENT:   Therapeutic Activity (15 Minutes): Therapeutic activity included Ambulation on level ground and HEP practice and review to improve functional Activity tolerance and Mobility.     TREATMENT GRID:   Date:  9/15 Date:   Date:     Activity/Exercise Parameters Parameters Parameters   Ankle Pumps 10     Long Arc Quads 10     Seated Hip Flexion 10     Elbow Flexion 10     Shoulder Flexion 10                      AFTER TREATMENT PRECAUTIONS: Bed, Bed/Chair Locked, Call light within reach, Needs within reach, and sitting up on side of bed    INTERDISCIPLINARY COLLABORATION:  RN/ PCT    EDUCATION: Education Given To: Patient  Education Provided: Role of Therapy;Plan of Care;Home Exercise Program  Education Outcome: Verbalized understanding;Demonstrated understanding    TIME IN/OUT:  Time In: 0850  Time Out: 0910  Minutes: Johan Weber PT

## 2022-09-15 NOTE — PROGRESS NOTES
Nanci Giron, MS, RD, LD  Surgical Weight Loss Dietitian  [address] [facility]  Kristin Quintana  Phone [phone]   Fax [phone]    Nutrition Assessment:  Anthropometrics: Ht: 5'3.5\", Wt: 290#, 203 %IBW, 50.64 BMI  Co-morbidities: HTN, GERD, Joint pain  Pt sitting on side of bed, states feeling very good this morning. They are tolerating liquids well and have consumed most of second protein shake. Pt reports using IS and doing well with OOB movement. Nutrition Diagnosis:   Altered GI function R/T wt loss surgery as evidenced by s/p VSG. Morbid obesity R/T excessive energy intake as evidenced by 203 %IBW and 50.64 BMI. Intervention:   1. Pt visited by bariatric RD. Pt acknowledges the need for OOB movement. 2. RD answered pt questions and emphasized 1. Hydration 2. Protein 3. Movement. 3. Encouraged slow, small sips while upright. 4. Encouraged to begin MVI supplements. Reminded to take MVI and Calcium supplements separately. Monitoring and Evaluation:  1. Follow for tolerance of small amounts of clear, non-carbonated, no concentrated sweet clear liquids while admitted. Full Liquids when released to home. 2. Follow for weight loss per bariatric guidelines. 3. Bariatric RD to f/u as outpatient.      Nanci Giron MS, RD, LD

## 2022-09-15 NOTE — PROGRESS NOTES
IntraVENous Continuous NORTH Britt 125 mL/hr at 09/14/22 1722 New Bag at 09/14/22 1722    sodium chloride flush 0.9 % injection 5-40 mL  5-40 mL IntraVENous 2 times per day NORTH Britt   10 mL at 09/14/22 2142    sodium chloride flush 0.9 % injection 5-40 mL  5-40 mL IntraVENous PRN NORTH Osorio        0.9 % sodium chloride infusion   IntraVENous PRN NORTH Britt        oxyCODONE (ROXICODONE) immediate release tablet 5 mg  5 mg Oral Q4H PRN NORTH Osorio        HYDROmorphone HCl PF (DILAUDID) injection 0.5 mg  0.5 mg IntraVENous Q3H PRN NORTH Britt        ondansetron (ZOFRAN) injection 4 mg  4 mg IntraVENous Q6H PRN NORTH Britt   4 mg at 09/14/22 2141    prochlorperazine (COMPAZINE) injection 10 mg  10 mg IntraVENous Q6H PRN NORTH Osorio        lidocaine 2000 mg in dextrose 5% 500 mL infusion  1 mg/kg/hr (Ideal) IntraVENous Continuous NORTH Osorio 13.4 mL/hr at 09/14/22 1632 1 mg/kg/hr at 09/14/22 1632    hydrALAZINE (APRESOLINE) injection 10 mg  10 mg IntraVENous Q6H PRN NORTH Osorio        gabapentin (NEURONTIN) capsule 300 mg  300 mg Oral TID NORTH Britt   300 mg at 09/14/22 2141    heparin (porcine) injection 5,000 Units  5,000 Units SubCUTAneous 3 times per day NORTH Britt   5,000 Units at 09/15/22 0500    simethicone (MYLICON) chewable tablet 80 mg  80 mg Oral Q6H PRN NORTH Ruiz        sucralfate (CARAFATE) tablet 1 g  1 g Oral 4 times per day NORTH Britt   1 g at 09/15/22 0501       ALLERGIES:       Allergies   Allergen Reactions    Hydrocodone-Acetaminophen Itching and Nausea And Vomiting       I/O:      Intake/Output Summary (Last 24 hours) at 9/15/2022 0734  Last data filed at 9/15/2022 0030  Gross per 24 hour   Intake 1300 ml   Output 925 ml   Net 375 ml       Physical Examination:     BP (!) 143/85   Pulse 71   Temp 98.4 °F (36.9 °C) (Oral) Resp 18   Ht 5' 3.5\" (1.613 m)   Wt 290 lb 6.4 oz (131.7 kg)   SpO2 90%   BMI 50.64 kg/m²     General:  Alert, oriented, cooperative in no acute distress. Neuro:  Alert, oriented to person, place and time. Lungs:  Unlabored breathing. Symmetrical chest expansion. Heart: Regular rate and rhythm. Abdomen:  Soft, appropriately tender at incision sites. Lap incisions C/D/I without presence of erythema, infection, or allergic reaction. Extremities:  Extremities normal, atraumatic, no cyanosis or edema. Labs / Imaging / Diagnostics:     Labs: All recent labs were reviewed.    Recent Results (from the past 24 hour(s))   TYPE AND SCREEN    Collection Time: 09/14/22 12:16 PM   Result Value Ref Range    Crossmatch expiration date 09/17/2022,2359     ABO/Rh O POSITIVE     Antibody Screen NEG    POC Pregnancy Urine Qual    Collection Time: 09/14/22 12:37 PM   Result Value Ref Range    Preg Test, Ur Negative NEG     Basic Metabolic Panel    Collection Time: 09/15/22  5:40 AM   Result Value Ref Range    Sodium 141 136 - 145 mmol/L    Potassium 3.6 3.5 - 5.1 mmol/L    Chloride 109 101 - 110 mmol/L    CO2 28 21 - 32 mmol/L    Anion Gap 4 4 - 13 mmol/L    Glucose 92 65 - 100 mg/dL    BUN 10 6 - 23 MG/DL    Creatinine 0.65 0.6 - 1.0 MG/DL    GFR African American >60 >60 ml/min/1.73m2    GFR Non- >60 >60 ml/min/1.73m2    Calcium 8.5 8.3 - 10.4 MG/DL   CBC with Auto Differential    Collection Time: 09/15/22  5:40 AM   Result Value Ref Range    WBC 8.4 4.3 - 11.1 K/uL    RBC 4.38 4.05 - 5.2 M/uL    Hemoglobin 11.8 11.7 - 15.4 g/dL    Hematocrit 38.1 35.8 - 46.3 %    MCV 87.0 79.6 - 97.8 FL    MCH 26.9 26.1 - 32.9 PG    MCHC 31.0 (L) 31.4 - 35.0 g/dL    RDW 14.1 11.9 - 14.6 %    Platelets 867 769 - 564 K/uL    MPV 9.1 (L) 9.4 - 12.3 FL    nRBC 0.00 0.0 - 0.2 K/uL    Differential Type AUTOMATED      Seg Neutrophils 73 43 - 78 %    Lymphocytes 15 13 - 44 %    Monocytes 11 4.0 - 12.0 %    Eosinophils % 1 0.5 - 7.8 %    Basophils 0 0.0 - 2.0 %    Immature Granulocytes 0 0.0 - 5.0 %    Segs Absolute 6.1 1.7 - 8.2 K/UL    Absolute Lymph # 1.3 0.5 - 4.6 K/UL    Absolute Mono # 0.9 0.1 - 1.3 K/UL    Absolute Eos # 0.0 0.0 - 0.8 K/UL    Basophils Absolute 0.0 0.0 - 0.2 K/UL    Absolute Immature Granulocyte 0.0 0.0 - 0.5 K/UL       Imaging: No images were independently reviewed. Assessment / Plan: Active Hospital Problems    Diagnosis Date Noted    Morbid obesity ([facility] [address] [E66.01] 08/25/2022       Natalie Norman is a 48 y.o. female s/p laparoscopic sleeve gastrectomy    Pain control   2. Bariatric diet - clear liquids, Ensure Max protein  3. Lap incisions C/D/I  4. No tachycardia overnight   5. Labs reviewed. WBC 8.4. Hgb stable 11.8.  6.   OOB and ambulate as tolerated. PT consulted. 7.   DVT prophylaxis - SCDs/Heparin    Discharge: to home today    Monroe Padilla PA-C  Date: 9/15/2022    Counseling time:counseling time more than 50% of visit: 30 minutes: I spent this time preparing to see patient (including chart review and preparation), obtaining and/or reviewing additional medical history, performing a physical exam and evaluation, documenting clinical information in the electronic health record, independently interpreting results, communicating results to patient, family or caregiver, and/or coordinating care.

## 2022-09-15 NOTE — DISCHARGE INSTRUCTIONS
Bariatric Surgery Discharge Instructions    Surgeon: Dr. Dg Jarvis    Follow up: Follow up with your surgeon as previously scheduled in 1-2 weeks. [address] Loss  Office number: [phone]    Diet:  When discharged from the hospital, you may begin clear and full liquid diet plus protein supplements  Start with clear liquids and progress to full liquids as tolerated  Goals: 60 grams of protein per day, 64 ounces of fluid per day  Avoid carbonated beverages and straws, avoid excessive air swallowing when drinking/eating, minimize caffeine intake. No alcohol. Wound care:  Surgical glue will flake off in 7-10 days; the edges of steri-strips may come up but leave them in place until your follow up appointment  May shower following surgery. It is okay to get soap and water on all wounds when showering. Do not soak wounds under water (bath, pool, hot tub) until healed about three weeks after surgery. Activity:  No lifting more than 20 lbs for 3-4 weeks. No repetitive abdominal straining (sit-ups, push-ups, crunches, pull-ups, squats), for 3 weeks. Okay to perform normal activities of daily living and walk up stairs. Walk daily. Continue deep breathing and use incentive spirometer at home. Return to school or work when you fee comfortable. Typically 1-2 weeks for a desk job or up to 4 weeks for a manual labor job. You may resume driving when you have minimal pain and are not taking narcotic pain medication. Medications: You will have been prescribed the following medications prior to discharge:  Percocet (5mg): take one pill by mouth every 4 hours as needed for pain  Zofran (8mg): take one pill by mouth every 8 hours for nausea or vomiting  Omeprazole (40mg): take one pill by mouth daily for 90 days  Use an over the counter stool softener (Miralax) or laxative (Ducolax, milk of magnesium) if you feel constipated.  You may not have a normal bowel movement being on a liquid diet. Do NOT take any NSAID medication (Ibuprofen, Aleve, Motrin, Naproxen, Celebrex, etc) after surgery  No nicotine in any form (cigarettes, vape, marijuana, chew, gum, patches)  It is acceptable to space multiple medications throughout the day as needed  Oral medications should be crushed if they are large; acceptable to take whole pills if they are small  You may resume home medications unless instructed otherwise    Notify your surgeon if. ..   Fever of 101.5 degrees F or 38 degrees C (by mouth)  Shortness of breath or difficulty breathing  Chest pain or very fast heart rate  Significant leg swelling and/or pain  Redness, swelling, foul-smelling drainage, bleeding or heat around your incisions  Persistent vomiting and/or inability to keep fluids down, lightheadedness  Significant abdominal bloating, swelling or pain

## 2022-09-15 NOTE — PROGRESS NOTES
[facility] End of Shift Note      1 Day Post-Op    Voiding: Yes    Flatus: No    Tolerating Clear Liquids?: Yes    Tolerating Ensure Max?: Yes    Ambulated in hallway 3 times. Up to chair for  meals.     Lidocaine: Yes    PRN Pain Medications Used?: No    IS Used: Yes    Ideal body weight: 53.5 kg (118 lb 0.9 oz)    Signed By: Marcos Segovia RN     September 15, 2022

## 2022-09-16 ENCOUNTER — TELEPHONE (OUTPATIENT)
Dept: FAMILY MEDICINE CLINIC | Facility: CLINIC | Age: 47
End: 2022-09-16

## 2022-09-16 NOTE — TELEPHONE ENCOUNTER
Called patient to schedule TCV appt following discharge from [facility] 09/15/2022. Dx Gastrectomy Sleeve Laparoscopic.

## 2022-09-23 ENCOUNTER — OFFICE VISIT (OUTPATIENT)
Dept: SURGERY | Age: 47
End: 2022-09-23

## 2022-09-23 VITALS
SYSTOLIC BLOOD PRESSURE: 152 MMHG | BODY MASS INDEX: 48.14 KG/M2 | HEART RATE: 98 BPM | WEIGHT: 282 LBS | HEIGHT: 64 IN | DIASTOLIC BLOOD PRESSURE: 86 MMHG

## 2022-09-23 DIAGNOSIS — E66.01 OBESITY, CLASS III, BMI 40-49.9 (MORBID OBESITY) (HCC): ICD-10-CM

## 2022-09-23 DIAGNOSIS — Z98.84 S/P LAPAROSCOPIC SLEEVE GASTRECTOMY: ICD-10-CM

## 2022-09-23 DIAGNOSIS — E66.01 MORBID OBESITY (HCC): Primary | ICD-10-CM

## 2022-09-23 DIAGNOSIS — Z71.3 DIETARY COUNSELING: ICD-10-CM

## 2022-09-23 DIAGNOSIS — Z71.82 EXERCISE COUNSELING: ICD-10-CM

## 2022-09-23 PROCEDURE — 99024 POSTOP FOLLOW-UP VISIT: CPT | Performed by: PHYSICIAN ASSISTANT

## 2022-09-23 NOTE — PROGRESS NOTES
Delano Gorman MS, RD, LD  Surgical Weight Loss Dietitian  [address] [place] [address]  Kristin Quintana  Phone [phone]   Fax [phone]    [facility] NUTRITION REASSESSMENT  Anthropometrics:    Ht: 53.5, wt: 282#, 197% IBW, 49.17 BMI  Pt has lost 10 lbs since last visit. Assessment:  Pt is 1 weeks s/p VSG. Pt is good with full liquid diet compliance. Intake of ~70g protein/d and ~60+oz fluid/d. Pt is drinking water, protein shakes, bodyarmor lyte, sf lemonade, sf protein pudding, soup with protein powder, cheese. Pt is exercising via walking as tolerated 7x week. Pt is taking MVI and Ca supplements as recommended. Nutrition Diagnosis:   Altered GI function R/T wt loss surgery as evidenced by s/p VSG. Class III obesity R/T excessive energy intake as evidenced by BMI = 49.17 and 197 % IBW.  --ongoing, modified--BMI and %IBW adjusted to reflect weight loss--     Intervention:   Discussed food choices and meal ideas on pureed/smooth diet, once released by surgeon. Encouraged pt to follow mindful eating behaviors, lean protein focus followed by non-starchy vegetables as able. Encouraged pt use protein supplements throughout the day as snacks rather than as meal replacement. Encouraged continued and increased activity. Monitoring and Evaluation:  Monitor for continued safe, supervised weight loss for VSG. Monitor pt for meeting protein requirements of 60-80g/d, 64+ fl oz fluids. Follow for tolerance of pureed/smooth diet. Follow for increased activity.   F/U per MD Delano Gorman, MS, RD, LD

## 2022-09-23 NOTE — PROGRESS NOTES
Heraclio Lowry PA-C  Bariatric & Advanced Laparoscopic Surgery & Endoscopy  [address] [place] [address], [address] [facility]  Kristin Quintana  Phone [phone]   Fax [phone]    Date of visit: 2022          Name: Kristie Sarmiento      MRN: [id number]       : 1975       Age: 48 y.o. Sex: female        PCP: DEBBIE Nelson NP     Surgeon: Dr. Marleen Durant. Matheus  Procedure: laparoscopic sleeve gastrectomy    HPI:    1 week post-op visit after a laparoscopic sleeve gastrectomy was done on 2022. She has lost 10 lbs since her last office visit. Weight History Graph  Post-Surgical Weight Loss  Date: 22  Height: 5' 3.5\" (161.3 cm)  Weight: 282 lb (127.9 kg)  BMI: 49.17  Weight Change: -10 lbs  Total Weight Change: -10 lbs  % EBWL: 7%    Evaluation of Pre-operative Co-morbid Conditions:    Hypertension Yes, number of medications- 1   GERD Yes, treatment medication- Omeprazole     Clinical Assessment and Physical Exam:    She is doing very well today and is feeling good. She reports that she has been adhering to the liquid diet without difficulty. She reports improving abdominal discomfort, but denies any abdominal pain, nausea or vomiting or heartburn. She denies fevers or chills, or any redness or drainage from the incision sites. She does not report having problems with constipation. Her pain is well controlled and is not taking pain medications. She has been taking the PPI without difficulty. She has been doing her protein shakes without difficulty. Protein:  70 grams per day  Fluids:    60 ounces per day  Exercise:  walking daily  Denies reflux. Denies dysphagia. Tolerating Protein first diet without difficulty.     PMH:  Past Medical History:   Diagnosis Date    Anxiety and depression     Manage with meds    Difficult intravenous access     In past- pt had to have a central line bc IV's blew    GERD (gastroesophageal reflux disease)     Managed with meds Hypertension     managed with meds    PONV (postoperative nausea and vomiting)     Severe per pt    Spondylolisthesis     As a child, multiple surgeries       PSH:  Past Surgical History:   Procedure Laterality Date    ANKLE SURGERY Left     BACK SURGERY      x3- hardware and removed     SECTION      FRACTURE SURGERY      Right hand    LITHOTRIPSY      x 2    SLEEVE GASTRECTOMY N/A 2022    ERAS/ GASTRECTOMY SLEEVE LAPAROSCOPIC performed by Rekha Rodriguez MD at [address]:  Current Outpatient Medications   Medication Sig Dispense Refill    acetaminophen (TYLENOL) 500 MG tablet Take 1,000 mg by mouth every 6 hours as needed for Pain      venlafaxine (EFFEXOR XR) 37.5 MG extended release capsule Take 37.5 mg by mouth daily      omeprazole (PRILOSEC) 40 MG delayed release capsule Take 1 capsule by mouth every morning (before breakfast) 90 capsule 0    ondansetron (ZOFRAN) 4 MG tablet Take 1 tablet by mouth 3 times daily as needed for Nausea or Vomiting 30 tablet 0    vitamin D (ERGOCALCIFEROL) 1.25 MG (68472 UT) CAPS capsule Take 1 capsule by mouth once a week 12 capsule 1    clonazePAM (KLONOPIN) 0.5 MG tablet TAKE 1/2 TO 1 TABLET BY MOUTH EVERY 8 HOURS AS NEEDED FOR ANXIETY      irbesartan (AVAPRO) 150 MG tablet TAKE 1 TABLET BY MOUTH EVERY DAY FOR BLOOD PRESSURE. STOP LOSARTAN      venlafaxine (EFFEXOR XR) 75 MG extended release capsule Take 75 mg by mouth daily Takes 75 mg and 37.5       No current facility-administered medications for this visit. ALLERGIES:    Allergies   Allergen Reactions    Hydrocodone-Acetaminophen Itching and Nausea And Vomiting       SH:  Social History     Tobacco Use    Smoking status: Never    Smokeless tobacco: Never   Vaping Use    Vaping Use: Never used   Substance Use Topics    Alcohol use: Never    Drug use: Never       FH:  No family history on file. Review of systems:  The patient has no difficulty with chest pain or shortness of breath.   No fever or chills. Comprehensive 13 point review of systems was otherwise unremarkable except as noted above. Physical Exam:     BP (!) 152/86   Pulse 98   Ht 5' 3.5\" (1.613 m)   Wt 282 lb (127.9 kg)   BMI 49.17 kg/m²     General:  Well-developed, well-nourished, no distress. Psych:  Cooperative, good insight and judgement. Neuro:  Alert, oriented to person, place and time. Lungs:  Unlabored breathing. Symmetrical chest expansion. Heart:  Regular rate and rhythm. Abdomen:  Soft, non-tender, non-distended. No guarding or rebound. Lap incisions are healing well. Labs: All labs reviewed today. Imaging: Al imaging reviewed today. Diagnosis Orders   1. Morbid obesity ([address]        2. Obesity, Class III, BMI 40-49.9 (morbid obesity) ([address]        3. S/P laparoscopic sleeve gastrectomy        4. Dietary counseling        5. Exercise counseling            Assessment/Plan:    Bimal Frausto is a 48 y.o. female here to follow up s/p laparoscopic sleeve gastrectomy    She is doing well without any major concerns. She is adhering to the diet and we discussed and addressed all her questions. She will advance to a pureed / smooth diet. I encouraged her to continue physical activity and aim for 300 minutes a week and include 2 strength session a week to maintain the weight loss. She will continue recommended vitamin/mineral supplementation and PPI until the prescription is complete. Return to the office in 2 weeks with myself. Yobany Clay PA-C  9/23/2022    Counseling time:counseling time more than 50% of visit: 20 minutes: I spent this time preparing to see patient (including chart review and preparation), obtaining and/or reviewing additional medical history, performing a physical exam and evaluation, documenting clinical information in the electronic health record, independently interpreting results, communicating results to patient, family or caregiver, and/or coordinating care.

## 2022-09-29 NOTE — PROGRESS NOTES
Jeanmarie Quiles PA-C  Bariatric & Advanced Laparoscopic Surgery & Endoscopy  [address] [place] [address]  Kristin Quintana  Phone [phone]   Fax [phone]    Date of visit: 10/6/2022          Name: Darci Reich      MRN: [id number]       : 1975       Age: 48 y.o. Sex: female        PCP: DEBBIE Gupta NP     Surgeon: Dr. Jessee Matias. Matheus  Procedure: laparoscopic sleeve gastrectomy    HPI:    3 weeks post-op visit after a laparoscopic sleeve gastrectomy was done on 2022. She has lost 10 lbs since her last office visit. Weight History Graph  Post-Surgical Weight Loss  Date: 10/06/22  Height: 5' 3.5\" (161.3 cm)  Weight: 272 lb (123.4 kg)  BMI: 47.42  Weight Change: -10 lbs  Total Weight Change: -20 lbs  % EBWL: 13%    Evaluation of Pre-operative Co-morbid Conditions:    Hypertension Yes, number of medications- 1   GERD Yes, treatment medication- Omeprazole      Clinical Assessment and Physical Exam:    She is doing very well today and is feeling good. She reports that she has been adhering to the pureed / smooth diet with some difficulty. She denies any abdominal pain, nausea or heartburn. She admits to one episode of vomiting after eating too quickly. She denies fevers or chills, or any redness or drainage from the incision sites. She does not report having problems with constipation, but does report occasional diarrhea if she consumes the wrong things. Her pain is well controlled and is not taking pain medications. She has been taking the PPI without difficulty. She has been doing her protein shakes without difficulty. Protein:  70 grams per day  Fluids:    60 ounces per day  Exercise:  walking and PT exercises 5x per week for 30-40 minutes  Denies reflux. Denies dysphagia. Tolerating Protein first diet without difficulty.     PMH:  Past Medical History:   Diagnosis Date    Anxiety and depression     Manage with meds    Difficult intravenous access     In past- pt had to have a central line bc IV's blew    GERD (gastroesophageal reflux disease)     Managed with meds    Hypertension     managed with meds    PONV (postoperative nausea and vomiting)     Severe per pt    Spondylolisthesis     As a child, multiple surgeries       PSH:  Past Surgical History:   Procedure Laterality Date    ANKLE SURGERY Left     BACK SURGERY      x3- hardware and removed     SECTION      FRACTURE SURGERY      Right hand    LITHOTRIPSY      x 2    SLEEVE GASTRECTOMY N/A 2022    ERAS/ GASTRECTOMY SLEEVE LAPAROSCOPIC performed by Genna Krause MD at [address] [place] [address]:  Current Outpatient Medications   Medication Sig Dispense Refill    acetaminophen (TYLENOL) 500 MG tablet Take 1,000 mg by mouth every 6 hours as needed for Pain      venlafaxine (EFFEXOR XR) 37.5 MG extended release capsule Take 37.5 mg by mouth daily      omeprazole (PRILOSEC) 40 MG delayed release capsule Take 1 capsule by mouth every morning (before breakfast) 90 capsule 0    ondansetron (ZOFRAN) 4 MG tablet Take 1 tablet by mouth 3 times daily as needed for Nausea or Vomiting 30 tablet 0    vitamin D (ERGOCALCIFEROL) 1.25 MG (78407 UT) CAPS capsule Take 1 capsule by mouth once a week 12 capsule 1    clonazePAM (KLONOPIN) 0.5 MG tablet TAKE 1/2 TO 1 TABLET BY MOUTH EVERY 8 HOURS AS NEEDED FOR ANXIETY      irbesartan (AVAPRO) 150 MG tablet TAKE 1 TABLET BY MOUTH EVERY DAY FOR BLOOD PRESSURE. STOP LOSARTAN      venlafaxine (EFFEXOR XR) 75 MG extended release capsule Take 75 mg by mouth daily Takes 75 mg and 37.5       No current facility-administered medications for this visit.        ALLERGIES:    Allergies   Allergen Reactions    Hydrocodone-Acetaminophen Itching and Nausea And Vomiting       SH:  Social History     Tobacco Use    Smoking status: Never    Smokeless tobacco: Never   Vaping Use    Vaping Use: Never used   Substance Use Topics    Alcohol use: Never    Drug use: Never FH:  No family history on file. Review of systems:  The patient has no difficulty with chest pain or shortness of breath. No fever or chills. Comprehensive 13 point review of systems was otherwise unremarkable except as noted above. Physical Exam:     /77   Pulse 98   Ht 5' 3.5\" (1.613 m)   Wt 272 lb (123.4 kg)   BMI 47.43 kg/m²     General:  Well-developed, well-nourished, no distress. Psych:  Cooperative, good insight and judgement. Neuro:  Alert, oriented to person, place and time. Lungs:  Unlabored breathing. Symmetrical chest expansion. Heart:  Regular rate and rhythm. Abdomen:  Soft, non-tender, non-distended. No guarding or rebound. Lap incisions are healing well. Labs: All labs reviewed today. Imaging: All imaging reviewed today. Diagnosis Orders   1. Morbid obesity ([address]        2. Obesity, Class III, BMI 40-49.9 (morbid obesity) ([address]        3. S/P laparoscopic sleeve gastrectomy        4. Dietary counseling        5. Exercise counseling            Assessment/Plan:    Garrick Monique is a 48 y.o. female here to follow up s/p laparoscopic sleeve gastrectomy    She is doing well without any major concerns. She is adhering to the diet and we discussed and addressed all her questions. She will advance to a soft diet. I encouraged her to continue physical activity and aim for 300 minutes a week and include 2 strength session a week to maintain the weight loss. She will continue recommended vitamin/mineral supplementation and PPI until the prescription is complete. Return to the office in 3 weeks with myself.     Yelitza Cardona PA-C  10/6/2022    Counseling time:counseling time more than 50% of visit: 20 minutes: I spent this time preparing to see patient (including chart review and preparation), obtaining and/or reviewing additional medical history, performing a physical exam and evaluation, documenting clinical information in the electronic health record, independently interpreting results, communicating results to patient, family or caregiver, and/or coordinating care.

## 2022-10-05 NOTE — PROGRESS NOTES
Gladys Orta MS, RD, LD  Surgical Weight Loss Dietitian  [address], [address] [facility]  Kristin Quintana  Phone [phone]   Fax [phone]    [facility] NUTRITION REASSESSMENT  Anthropometrics:    Ht: 53.5, wt: 272#, 190% IBW, 47.43 BMI  Pt has lost 10 lbs since last visit. Assessment:  Pt is 3 weeks s/p VSG. Pt is doing well with pureed/smooth diet compliance. Intake of ~70g protein/d and ~60oz fluid/d. Pt is waiting 30 minutes after eating to drink liquids. Pt is eating and drinking protein yogurt, eggs with cottage cheese, cottage cheese with sf jello and strawberries, grilled chicken, refried beans and cheese. Pt is exercising via walking and PT exercises for 30-40 minutes 5x week. Pt is taking MVI and Ca supplements as recommended. Nutrition Diagnosis:   Altered GI function R/T wt loss surgery as evidenced by s/p VSG. Class III obesity R/T excessive energy intake as evidenced by BMI = 47.43 and 190 % IBW.  --ongoing, modified--BMI and %IBW adjusted to reflect weight loss--     Intervention:   Discussed food choices and meal ideas on Soft diet, once released by surgeon. Encouraged pt to follow mindful eating behaviors, lean protein focus followed by non-starchy vegetables as able. Encouraged pt use protein supplements throughout the day as snacks rather than as meal replacement. Encouraged continued and increased activity. Monitoring and Evaluation:  Monitor for continued safe, supervised weight loss for VSG. Monitor pt for meeting requirements of 60-80g/d protein, 64+ fl oz fluids. Follow for tolerance of Soft diet. Follow for increased activity.   F/U per MD Gladys Orta, MS, RD, LD

## 2022-10-06 ENCOUNTER — OFFICE VISIT (OUTPATIENT)
Dept: SURGERY | Age: 47
End: 2022-10-06

## 2022-10-06 VITALS
DIASTOLIC BLOOD PRESSURE: 77 MMHG | HEIGHT: 64 IN | HEART RATE: 98 BPM | WEIGHT: 272 LBS | SYSTOLIC BLOOD PRESSURE: 125 MMHG | BODY MASS INDEX: 46.44 KG/M2

## 2022-10-06 DIAGNOSIS — Z71.3 DIETARY COUNSELING: ICD-10-CM

## 2022-10-06 DIAGNOSIS — E66.01 MORBID OBESITY (HCC): Primary | ICD-10-CM

## 2022-10-06 DIAGNOSIS — E66.01 OBESITY, CLASS III, BMI 40-49.9 (MORBID OBESITY) (HCC): ICD-10-CM

## 2022-10-06 DIAGNOSIS — Z71.82 EXERCISE COUNSELING: ICD-10-CM

## 2022-10-06 DIAGNOSIS — Z98.84 S/P LAPAROSCOPIC SLEEVE GASTRECTOMY: ICD-10-CM

## 2022-10-06 PROCEDURE — 99024 POSTOP FOLLOW-UP VISIT: CPT | Performed by: PHYSICIAN ASSISTANT

## (undated) DEVICE — RELOAD STPL H4.1X2MM DIA60MM THCK TISS GRN 6 ROW PWR GST B

## (undated) DEVICE — ADHESIVE SKIN CLSR 0.7ML TOP DERMBND ADV

## (undated) DEVICE — RELOAD STPL L60MM H2.3-4.2MM VERY THCK TISS BLK 6 ROW

## (undated) DEVICE — SOLUTION IRRIG 1000ML 0.9% SOD CHL USP POUR PLAS BTL

## (undated) DEVICE — TROCAR: Brand: KII® SLEEVE

## (undated) DEVICE — CARDINAL HEALTH FLEXIBLE LIGHT HANDLE COVER: Brand: CARDINAL HEALTH

## (undated) DEVICE — SEALANT TISS 4 CC FIBRIN VISTASEAL

## (undated) DEVICE — SUTURE ETHBND EXCEL SZ 0 L30IN NONABSORBABLE GRN L26MM SH X834H

## (undated) DEVICE — TROCAR: Brand: KII® OPTICAL ACCESS SYSTEM

## (undated) DEVICE — GASTRIC SLEEVE: Brand: MEDLINE INDUSTRIES, INC.

## (undated) DEVICE — TROCAR: Brand: KII FIOS FIRST ENTRY

## (undated) DEVICE — VISIGI 3D®  CALIBRATION SYSTEM  SIZE 40FR SLEEVE/STD: Brand: BOEHRINGER® VISIGI™ 3D SLEEVE GASTRECTOMY CALIBRATION SYSTEM, SIZE 40FR

## (undated) DEVICE — INTENDED FOR TISSUE SEPARATION, AND OTHER PROCEDURES THAT REQUIRE A SHARP SURGICAL BLADE TO PUNCTURE OR CUT.: Brand: BARD-PARKER SAFETY BLADES SIZE 15, STERILE

## (undated) DEVICE — RELOAD STPL L60MM H1.5-3.6MM REG TISS BLU GRIPPING SURF B

## (undated) DEVICE — APPLICATOR MEDICATED 26 CC SOLUTION HI LT ORNG CHLORAPREP

## (undated) DEVICE — STAPLER SKIN L440MM 32MM LNG 12 FIRING B FRM PWR + GRIPPING

## (undated) DEVICE — APPLICATOR LAP 35 CM 2 RIGID VISTASEAL

## (undated) DEVICE — TUBING INSUFFLATION SMK EVAC HI FLO SET PNEUMOCLEAR

## (undated) DEVICE — SUTURE MCRYL SZ 4-0 L27IN ABSRB UD L19MM PS-2 1/2 CIR PRIM Y426H

## (undated) DEVICE — APPLIER CLP L SHFT DIA12MM 20 ROT MULT LIGACLP

## (undated) DEVICE — PUMP SUC IRR TBNG L10FT W/ HNDPC ASSEMB STRYKEFLOW 2

## (undated) DEVICE — SEALER TISS L45CM DIA5MM ARTC ADV BPLR STR TIP LAP APPRCH

## (undated) DEVICE — GARMENT,MEDLINE,DVT,INT,CALF,LG, GEN2: Brand: MEDLINE